# Patient Record
Sex: FEMALE | Race: WHITE | NOT HISPANIC OR LATINO | URBAN - METROPOLITAN AREA
[De-identification: names, ages, dates, MRNs, and addresses within clinical notes are randomized per-mention and may not be internally consistent; named-entity substitution may affect disease eponyms.]

---

## 2017-03-09 ENCOUNTER — ALLSCRIPTS OFFICE VISIT (OUTPATIENT)
Dept: OTHER | Facility: OTHER | Age: 72
End: 2017-03-09

## 2017-03-09 DIAGNOSIS — Z13.820 ENCOUNTER FOR SCREENING FOR OSTEOPOROSIS: ICD-10-CM

## 2017-03-09 DIAGNOSIS — Z12.31 ENCOUNTER FOR SCREENING MAMMOGRAM FOR MALIGNANT NEOPLASM OF BREAST: ICD-10-CM

## 2017-09-28 ENCOUNTER — ALLSCRIPTS OFFICE VISIT (OUTPATIENT)
Dept: OTHER | Facility: OTHER | Age: 72
End: 2017-09-28

## 2017-09-28 DIAGNOSIS — R73.03 PREDIABETES: ICD-10-CM

## 2017-09-28 DIAGNOSIS — E78.5 HYPERLIPIDEMIA: ICD-10-CM

## 2018-01-10 NOTE — RESULT NOTES
Verified Results  (Q) TEST AUTHORIZATION [id number] 02:00AM Jc Chou     Test Name Result Flag Reference   TEST(S) ORDERED ON$REQUISITION HEMOGLOBIN A1c     TEST CODE: [id number]     CLIENT CONTACT: DARSHANA CURRY     REPORT ALWAYS MESSAGE$SIGNATURE See Below     The laboratory testing on this patient was verbally requested  or confirmed by the ordering physician or his or her authorized  representative after contact with an employee of [facility]  Federal regulations require that we maintain on file written  authorization for all laboratory testing  Accordingly we are asking  that the ordering physician or his or her authorized representative  sign a copy of this report and promptly return it to the client    Signature:____________________________________________________     (Q) HEMOGLOBIN A1c [id number] 02:00AM Jc Chou     Test Name Result Flag Reference   HEMOGLOBIN A1c 6 0 % of total Hgb H <5 7   According to ADA guidelines, hemoglobin A1c <7 0%  represents optimal control in non-pregnant diabetic  patients  Different metrics may apply to specific  patient populations  Standards of Medical Care in  [phone]  Diabetes Care  2013;36:s11-s66     For the purpose of screening for the presence of  diabetes  <5 7%       Consistent with the absence of diabetes  5 7-6 4%    Consistent with increased risk for diabetes              (prediabetes)  >or=6 5%    Consistent with diabetes     This assay result is consistent with an increased risk  of diabetes  Currently, no consensus exists for use of hemoglobin  A1c for diagnosis of diabetes for children  NO COLLECTION DATE RECEIVED  WE HAVE USED  THE DATE THE SPECIMEN WAS RECEIVED BY THIS  LABORATORY AS THE COLLECTION DATE  IF THIS  IS INCORRECT, PLEASE CONTACT CLIENT SERVICES    PHONE NUMBER: [phone]     (1) CBC/PLT/DIFF [id number] 02:00AM Jc Chou     Test Name Result Flag Reference   WHITE BLOOD CELL COUNT 9 0 Thousand/uL  3 8-10 8   RED BLOOD CELL COUNT 4 66 Million/uL  3 80-5 10   HEMOGLOBIN 14 3 g/dL  11 7-15 5   HEMATOCRIT 43 2 %  35 0-45 0   MCV 92 6 fL  80 0-100 0   MCH 30 6 pg  27 0-33 0   MCHC 33 0 g/dL  32 0-36 0   RDW 15 3 % H 11 0-15 0   PLATELET COUNT 944 Thousand/uL  140-400   MPV 7 8 fL  7 5-11 5   ABSOLUTE NEUTROPHILS 5022 cells/uL  9403-2206   ABSOLUTE LYMPHOCYTES 3096 cells/uL  850-3900   ABSOLUTE MONOCYTES 693 cells/uL  200-950   ABSOLUTE EOSINOPHILS 144 cells/uL     ABSOLUTE BASOPHILS 45 cells/uL  0-200   NEUTROPHILS 55 8 %     LYMPHOCYTES 34 4 %     MONOCYTES 7 7 %     EOSINOPHILS 1 6 %     BASOPHILS 0 5 %     NO COLLECTION DATE RECEIVED  WE HAVE USED  THE DATE THE SPECIMEN WAS RECEIVED BY THIS  LABORATORY AS THE COLLECTION DATE  IF THIS  IS INCORRECT, PLEASE CONTACT CLIENT SERVICES  PHONE NUMBER: [phone]     (1) CHOLESTEROL, TOTAL [id number] 02:00AM [employer]     Test Name Result Flag Reference   CHOLESTEROL, TOTAL 270 mg/dL H 125-200     (1) COMPREHENSIVE METABOLIC PANEL [id number] 18:96QG [employer] [facility]     Test Name Result Flag Reference   GLUCOSE 135 mg/dL H 65-99   Fasting reference interval   UREA NITROGEN (BUN) 20 mg/dL  7-25   CREATININE 1 07 mg/dL H 0 50-0 99   For patients >52years of age, the reference limit  for Creatinine is approximately 13% higher for people  identified as -American  eGFR NON-AFR   AMERICAN 53 mL/min/1 73m2 L > OR = 60   eGFR AFRICAN AMERICAN 61 mL/min/1 73m2  > OR = 60   BUN/CREATININE RATIO 19 (calc)  6-22   SODIUM 134 mmol/L L 135-146   POTASSIUM 3 6 mmol/L  3 5-5 3   CHLORIDE 98 mmol/L     CARBON DIOXIDE 24 mmol/L  19-30   CALCIUM 10 0 mg/dL  8 6-10 4   PROTEIN, TOTAL 7 6 g/dL  6 1-8 1   ALBUMIN 4 4 g/dL  3 6-5 1   GLOBULIN 3 2 g/dL (calc)  1 9-3 7   ALBUMIN/GLOBULIN RATIO 1 4 (calc)  1 0-2 5   BILIRUBIN, TOTAL 0 4 mg/dL  0 2-1 2   ALKALINE PHOSPHATASE 90 U/L     AST 13 U/L  10-35   ALT 9 U/L  6-29

## 2018-01-13 VITALS
WEIGHT: 126 LBS | BODY MASS INDEX: 22.32 KG/M2 | SYSTOLIC BLOOD PRESSURE: 156 MMHG | DIASTOLIC BLOOD PRESSURE: 68 MMHG | HEIGHT: 63 IN

## 2018-01-14 NOTE — PROGRESS NOTES
Assessment    1  Hypertension, essential (401 9) (I10)   2  Prediabetes (790 29) (R73 03)   3  Hyperlipidemia, unspecified hyperlipidemia type (272 4) (E78 5)    Plan  Hypertension, essential    · AmLODIPine Besylate 10 MG Oral Tablet; TAKE 1 TABLET DAILY  PMH: Encounter for screening colonoscopy    · COLONOSCOPY; Status:Active; Requested for:09Mar2017;   PMH: Encounter for screening mammogram for malignant neoplasm of breast    · * MAMMO SCREENING BILATERAL W CAD; Status:Hold For - Scheduling; Requested  for:09Mar2017;   PMH: Screening for osteoporosis    · * DXA BONE DENSITY SPINE HIP AND PELVIS; Status:Hold For - Scheduling; Requested  for:09Mar2017;     Discussion/Summary    In summary then this is a 72-year-old female smoker follow-up of essential hypertension  Her blood pressure is mildly uncontrolled systolically though she feels it's related to anxiety over having her blood pressure checked  She's asymptomatic from a cardiovascular or pulmonary standpoint  She does exercise  We noted last year that her blood work is in the prediabetic range  She is not fasting today and prefers to wait until her next visit for which she will fast and we'll repeat her CBC CMP and hemoglobin A1c along with her lipids as she does have hyperlipidemia as well  She just wants the fat / cholesterol in her diet, discontinue smoking and continue her exercise pattern she agrees  She is not interested in pharmacologic treatment of her hyperlipidemia presently  She declines influenza vaccine  History of Present Illness  The patient states her hyperlipidemia has been stable since the last visit  Comorbid Illnesses: hypertension  Symptoms: denies chest pain and denies muscle pain  Associated symptoms include no focal neurologic deficits  Lifestyle: Diet: She consumes a diverse and healthy diet  Weight Issues: She does not have any weight concerns  Exercise: She exercises regularly  Smoking: She uses tobacco Alcohol: She denies alcohol use  Drug Use: She denies drug use  Medications: The patient is not currently on any medications for her hyperlipidemia  The patient is not doing well with her hyperlipidemia goals  The patient is due for a lipid panel  The patient is a 74-year-old female here for follow-up of essential hypertension  She was also noted to have hyperlipidemia and prediabetes her fasting blood work one year ago  She's been asked to return fasting but did not do so again today  She denies any cardiovascular or pulmonary complaint  She remains physically active and does planks other exercises that her BMI is in the ideal body weight range  She does continue to use tobacco which we discussed again with her  Alcohol use is infrequent  She does eat a healthy diet though she does not specifically attention to carbohydrates her cholesterol  The patient presents for follow-up of essential hypertension  The patient states she has been stable with her blood pressure control since the last visit  She has no comorbid illnesses  Symptoms: denies dyspnea, denies chest pain and denies lower extremity edema  Associated symptoms include no headache and no focal neurologic deficits  Lifestyle: Diet: She consumes a diverse and healthy diet  Weight Issues: She does not have any weight concerns  Exercise: She exercises regularly  Smoking: She uses tobacco Alcohol: She denies alcohol use  Drug Use: She denies drug use  Does planks  1 PPD  Medications: the patient is adherent with her medication regimen  She denies medication side effects  Medication(s): a calcium channel blocker  The patient is being seen for a routine clinic follow-up of pre-diabetes  Recent measurements: hemoglobin A1c date 2/2016 and hemoglobin A1c 6 0 %  Her weight is unchanged  The patient is currently asymptomatic  Review of Systems    Constitutional: recent 1 5 lb weight gain     Cardiovascular: No complaints of slow heart rate, no fast heart rate, no chest pain, no palpitations, no leg claudication, no lower extremity edema  Respiratory: No complaints of shortness of breath, no wheezing, no cough, no SOB on exertion, no orthopnea, no PND  Gastrointestinal: no constipation, no diarrhea and no blood in stools  Genitourinary: no dysuria, no incontinence and no unexplained vaginal bleeding  Neurological: no headache and no dizziness  Psychiatric: no anxiety and no depression  Active Problems    1  Hypertension, essential (401 9) (I10)    Social History    · Current every day smoker (305 1) (F17 200)  The social history was reviewed and updated today  The social history was reviewed and is unchanged  Current Meds   1  AmLODIPine Besylate 10 MG Oral Tablet; TAKE 1 TABLET DAILY; Therapy: [id number] to (Evaluate:00Gyi7900)  Requested for: 40YWG3972; Last   Rx:13Jan2017 Ordered    Allergies    1  No Known Drug Allergies    Vitals  Vital Signs    Recorded: [id number] 08:54AM Recorded: 90DTU4285 08:33AM   Temperature  24 5 F   Systolic 283, LUE, Sitting 160, LUE, Sitting   Diastolic 84, LUE, Sitting 80, LUE, Sitting   BP CUFF SIZE Large    Height  5 ft 2 5 in   Weight  126 lb    BMI Calculated  22 68   BSA Calculated  1 58     Physical Exam    Constitutional   General appearance: No acute distress, well appearing and well nourished  Pulmonary   Respiratory effort: No increased work of breathing or signs of respiratory distress  Auscultation of lungs: Clear to auscultation  Cardiovascular   Auscultation of heart: Normal rate and rhythm, normal S1 and S2, without murmurs  The heart rate was normal  The rhythm was regular  Heart sounds: normal S1, normal S2 and no gallop heard  no murmurs were heard  Examination of extremities for edema and/or varicosities: Normal     Carotid pulses: Normal   no bruit heard over the right carotid and no bruit heard over the left carotid  Lymphatic   Palpation of lymph nodes in neck: No lymphadenopathy  Psychiatric   Orientation to person, place, and time: Normal     Mood and affect: Normal          Results/Data  PHQ-2 Adult Depression Screening 24KXH0173 08:34AM User, [employer]     Test Name Result Flag Reference   PHQ-2 Adult Depression Score 0     Over the last two weeks, how often have you been bothered by any of the following problems?   Little interest or pleasure in doing things: Not at all - 0  Feeling down, depressed, or hopeless: Not at all - 0   PHQ-2 Adult Depression Screening Negative       Falls Risk Assessment (Dx Z13 89 Screen for Neurologic Disorder) 40MMI3329 08:34AM User, [facility]     Test Name Result Flag Reference   Falls Risk      No falls in the past year       Signatures   Electronically signed by : ANICETO Leija ; Mar  9 2017  9:11AM EST                       (Author)

## 2018-01-22 VITALS
HEIGHT: 63 IN | DIASTOLIC BLOOD PRESSURE: 84 MMHG | WEIGHT: 126 LBS | BODY MASS INDEX: 22.32 KG/M2 | TEMPERATURE: 98.1 F | SYSTOLIC BLOOD PRESSURE: 146 MMHG

## 2018-04-10 PROBLEM — R73.03 PREDIABETES: Status: ACTIVE | Noted: 2017-03-09

## 2018-04-10 PROBLEM — E78.5 HYPERLIPIDEMIA: Status: ACTIVE | Noted: 2017-03-09

## 2018-06-18 ENCOUNTER — OFFICE VISIT (OUTPATIENT)
Dept: FAMILY MEDICINE CLINIC | Facility: CLINIC | Age: 73
End: 2018-06-18
Payer: MEDICARE

## 2018-06-18 VITALS
TEMPERATURE: 98.6 F | HEIGHT: 63 IN | DIASTOLIC BLOOD PRESSURE: 60 MMHG | BODY MASS INDEX: 23.92 KG/M2 | HEART RATE: 76 BPM | WEIGHT: 135 LBS | OXYGEN SATURATION: 98 % | SYSTOLIC BLOOD PRESSURE: 140 MMHG

## 2018-06-18 DIAGNOSIS — I10 HYPERTENSION, ESSENTIAL: Primary | ICD-10-CM

## 2018-06-18 PROCEDURE — 99213 OFFICE O/P EST LOW 20 MIN: CPT | Performed by: FAMILY MEDICINE

## 2018-06-18 RX ORDER — AMLODIPINE BESYLATE 10 MG/1
10 TABLET ORAL DAILY
Qty: 90 TABLET | Refills: 1 | Status: SHIPPED | OUTPATIENT
Start: 2018-06-18 | End: 2018-06-18 | Stop reason: SDUPTHER

## 2018-06-18 RX ORDER — AMLODIPINE BESYLATE 10 MG/1
10 TABLET ORAL DAILY
Qty: 30 TABLET | Refills: 0 | Status: SHIPPED | OUTPATIENT
Start: 2018-06-18 | End: 2019-01-10 | Stop reason: SDUPTHER

## 2018-06-18 NOTE — PATIENT INSTRUCTIONS
Please continue with prudent diet and try to increase your exercise regimen  Continue with her amlodipine  Please consider having your blood work performed  We will see you back in 6 months or sooner as needed

## 2018-06-18 NOTE — ASSESSMENT & PLAN NOTE
The patient is going to continue with amlodipine 10 mg daily  Based on home blood pressures and diastolic blood pressure today believe it is well controlled  We again and discussion of the fact that she has had no blood work for 2 years now  We have no idea as to her lipids, sugar renal function all of which were less than optimal at last check  She again declines having blood work performed though she stated as Shree Gonzales will consider it    I consider discharging her from the practice at her last visit but subsequent statements indicated that she would just not seek medical attention if so  I believe it is better to try to maintain control of her hypertension which she agrees to as an alternative to no treatment at all  She agrees to return in 6 months  She will call sooner as needed

## 2018-06-18 NOTE — PROGRESS NOTES
Assessment/Plan:  Hypertension, essential  The patient is going to continue with amlodipine 10 mg daily  Based on home blood pressures and diastolic blood pressure today believe it is well controlled  We again and discussion of the fact that she has had no blood work for 2 years now  We have no idea as to her lipids, sugar renal function all of which were less than optimal at last check  She again declines having blood work performed though she stated as Shree Gonzales will consider it    I consider discharging her from the practice at her last visit but subsequent statements indicated that she would just not seek medical attention if so  I believe it is better to try to maintain control of her hypertension which she agrees to as an alternative to no treatment at all  She agrees to return in 6 months  She will call sooner as needed  Diagnoses and all orders for this visit:    Hypertension, essential  -     Discontinue: amLODIPine (NORVASC) 10 mg tablet; Take 1 tablet (10 mg total) by mouth daily  -     amLODIPine (NORVASC) 10 mg tablet; Take 1 tablet (10 mg total) by mouth daily          Subjective:   Chief Complaint   Patient presents with    Blood Pressure Check     pt here for bp check  she needs a refill on her bp med which i did renew  she needs all her routine fasting labs done  Patient ID: Teagan Smith is a 72 y o  female  Home Bps are 118/60  I get dizzy at times when my BP gets down below 110  HPI  The patient is a 42-year-old female with a history of hypertension for which she is compliant with amlodipine  She states that her home blood pressures typically run between 110 and 120/60  She never gets blood pressures above 140  She has no headaches double vision diplopia chest pain shortness of breath or other cardiovascular pulmonary complaint  She has no GI or  complaint  The polyuria polydipsia polyphagia    She does note that she gained significant weight over the winter due to less activity and increased appetite  The following portions of the patient's history were reviewed and updated as appropriate: allergies, current medications, past family history, past medical history, past social history, past surgical history and problem list     Review of Systems   Constitution: Positive for weight gain  Negative for decreased appetite  9 pounds   Cardiovascular: Negative for chest pain, irregular heartbeat and leg swelling  Respiratory: Negative for cough, hemoptysis, shortness of breath and wheezing  Gastrointestinal: Negative for constipation, diarrhea, hematochezia, nausea and vomiting  Genitourinary: Negative for bladder incontinence, hesitancy and urgency  Objective:    Physical Exam   Constitutional: She is oriented to person, place, and time  She appears well-developed and well-nourished  No distress  Neck: Neck supple  No JVD present  No thyromegaly present  Cardiovascular: Normal rate, regular rhythm, normal heart sounds and intact distal pulses  Exam reveals no gallop  No murmur heard  Pulmonary/Chest: Effort normal and breath sounds normal  No respiratory distress  She has no wheezes  She has no rales  Musculoskeletal: She exhibits no edema  Lymphadenopathy:     She has no cervical adenopathy  Neurological: She is alert and oriented to person, place, and time  Psychiatric: She has a normal mood and affect

## 2019-01-10 ENCOUNTER — OFFICE VISIT (OUTPATIENT)
Dept: FAMILY MEDICINE CLINIC | Facility: CLINIC | Age: 74
End: 2019-01-10
Payer: MEDICARE

## 2019-01-10 VITALS
SYSTOLIC BLOOD PRESSURE: 180 MMHG | WEIGHT: 145 LBS | HEART RATE: 74 BPM | BODY MASS INDEX: 26.1 KG/M2 | DIASTOLIC BLOOD PRESSURE: 94 MMHG | TEMPERATURE: 96 F | OXYGEN SATURATION: 96 %

## 2019-01-10 DIAGNOSIS — I10 HYPERTENSION, ESSENTIAL: Primary | ICD-10-CM

## 2019-01-10 DIAGNOSIS — R73.03 PREDIABETES: ICD-10-CM

## 2019-01-10 DIAGNOSIS — Z23 ENCOUNTER FOR IMMUNIZATION: ICD-10-CM

## 2019-01-10 DIAGNOSIS — E78.5 HYPERLIPIDEMIA, UNSPECIFIED HYPERLIPIDEMIA TYPE: ICD-10-CM

## 2019-01-10 PROCEDURE — 99214 OFFICE O/P EST MOD 30 MIN: CPT | Performed by: FAMILY MEDICINE

## 2019-01-10 RX ORDER — AMLODIPINE BESYLATE 10 MG/1
10 TABLET ORAL DAILY
Qty: 90 TABLET | Refills: 1 | Status: SHIPPED | OUTPATIENT
Start: 2019-01-10 | End: 2019-07-11 | Stop reason: SDUPTHER

## 2019-01-10 NOTE — ASSESSMENT & PLAN NOTE
Her blood pressure is uncontrolled in the office today  She does have a history of white coat hypertension   Home blood pressures are well controlled per assertation  We asked her to resume her exercise pattern when she can, continue to restrict the salt in her diet  We also had a long discussion in regards to her lack of health maintenance needs again her unwillingness to go for blood work to assess her history of prediabetes, hyperlipidemia and possible early kidney disease  She seems somewhat more willing to address this based on her discussion today  She states that she Alicia Clements' have fasting blood work performed at her next visit  We encouraged her to come fasting and do so  We refilled her medication today

## 2019-01-10 NOTE — ASSESSMENT & PLAN NOTE
Begin monitoring diet for concentrated sweets  Resume exercise program when able  Seriously consider having fasting blood work performed at next visit

## 2019-01-10 NOTE — PROGRESS NOTES
Assessment/Plan:  Hypertension, essential  Her blood pressure is uncontrolled in the office today  She does have a history of white coat hypertension   Home blood pressures are well controlled per assertation  We asked her to resume her exercise pattern when she can, continue to restrict the salt in her diet  We also had a long discussion in regards to her lack of health maintenance needs again her unwillingness to go for blood work to assess her history of prediabetes, hyperlipidemia and possible early kidney disease  She seems somewhat more willing to address this based on her discussion today  She states that she Dora Aviles' have fasting blood work performed at her next visit  We encouraged her to come fasting and do so  We refilled her medication today  Hyperlipidemia  Begin monitoring diet for concentrated sweets  Resume exercise program when able  Seriously consider having fasting blood work performed at next visit  Prediabetes  Low concentrated sweet diet, exercise and weight loss  Diagnoses and all orders for this visit:    Hypertension, essential  -     amLODIPine (NORVASC) 10 mg tablet; Take 1 tablet (10 mg total) by mouth daily    Encounter for immunization  -     PREFERRED: influenza vaccine, [id number], high-dose, PF 0 5 mL, for patients 65 yr+ (FLUZONE HIGH-DOSE)    Hyperlipidemia, unspecified hyperlipidemia type    Prediabetes          Subjective:   Chief Complaint   Patient presents with    Blood Pressure Check     here for her 6 mth med check and refills  pt is not fasting today  pt declined the flu shot,     C/w Norvasc  I havent been able to exercise due to work on my house  Home Bps 136/70s  I feel good  No CP, SOB, edema, Has, diplopia  Patient ID: Sandra Leonardo is a 69 y o  female  HPI  The patient is a 74-year-old female who presents today for follow-up of essential hypertension    She also has hyperlipidemia and history of prediabetes which she has not addressed for some time  She has been compliant with her amlodipine  She states that typically her blood pressures are in the 110/70 range though recently due to inability to exercise based on renew patient's to home her blood pressures been run in the 120s over 80s range  She has had no cardiovascular pulmonary complaint  No headache diplopia or focal neurologic symptomatology  The following portions of the patient's history were reviewed and updated as appropriate: allergies, current medications, past family history, past medical history, past social history, past surgical history and problem list     Review of Systems   Constitution: Positive for weight gain  Negative for decreased appetite  10 pounds over holidays   Cardiovascular: Negative for chest pain, irregular heartbeat, leg swelling, orthopnea and paroxysmal nocturnal dyspnea  Respiratory: Positive for cough and shortness of breath  Negative for wheezing  Due to a viral illness and " just about gone "   Endocrine: Negative for polydipsia, polyphagia and polyuria  Hematologic/Lymphatic: Negative for adenopathy and bleeding problem  Gastrointestinal: Negative for constipation, diarrhea and hematochezia  Genitourinary: Positive for nocturia  Negative for frequency and hesitancy  1-2 x nocturia  Neurological: Negative for headaches  Objective:    Physical Exam   Constitutional: She is oriented to person, place, and time  She appears well-developed and well-nourished  Cardiovascular: Normal rate, regular rhythm and normal heart sounds  Occasional premature beats  Pulmonary/Chest: Effort normal and breath sounds normal  No respiratory distress  She has no wheezes  She has no rales  Musculoskeletal: She exhibits no edema  Neurological: She is alert and oriented to person, place, and time  Psychiatric: She has a normal mood and affect  Thought content normal    Nursing note and vitals reviewed

## 2019-07-11 ENCOUNTER — OFFICE VISIT (OUTPATIENT)
Dept: FAMILY MEDICINE CLINIC | Facility: CLINIC | Age: 74
End: 2019-07-11
Payer: MEDICARE

## 2019-07-11 VITALS
SYSTOLIC BLOOD PRESSURE: 138 MMHG | TEMPERATURE: 96.2 F | HEART RATE: 67 BPM | BODY MASS INDEX: 25.58 KG/M2 | DIASTOLIC BLOOD PRESSURE: 86 MMHG | OXYGEN SATURATION: 97 % | HEIGHT: 62 IN | WEIGHT: 139 LBS

## 2019-07-11 DIAGNOSIS — I10 HYPERTENSION, ESSENTIAL: Primary | ICD-10-CM

## 2019-07-11 DIAGNOSIS — R73.03 PREDIABETES: ICD-10-CM

## 2019-07-11 DIAGNOSIS — E78.5 HYPERLIPIDEMIA, UNSPECIFIED HYPERLIPIDEMIA TYPE: ICD-10-CM

## 2019-07-11 PROCEDURE — 99213 OFFICE O/P EST LOW 20 MIN: CPT | Performed by: FAMILY MEDICINE

## 2019-07-11 RX ORDER — AMLODIPINE BESYLATE 10 MG/1
10 TABLET ORAL DAILY
Qty: 90 TABLET | Refills: 1 | Status: SHIPPED | OUTPATIENT
Start: 2019-07-11 | End: 2020-01-23 | Stop reason: SDUPTHER

## 2019-07-11 NOTE — ASSESSMENT & PLAN NOTE
Blood pressure is well controlled based on home readings  Office reading is better today  She does have some degree of white coat hypertension  Again we discussed that she is overdue for blood work in regards to lipids, blood sugar, renal function X cetera  She states that she will consider blood work at her next visit though not presently

## 2019-07-11 NOTE — PROGRESS NOTES
Assessment/Plan:  Hypertension, essential  Blood pressure is well controlled based on home readings  Office reading is better today  She does have some degree of white coat hypertension  Again we discussed that she is overdue for blood work in regards to lipids, blood sugar, renal function X cetera  She states that she will consider blood work at her next visit though not presently  Diagnoses and all orders for this visit:    Hypertension, essential  -     amLODIPine (NORVASC) 10 mg tablet; Take 1 tablet (10 mg total) by mouth daily    Prediabetes    Hyperlipidemia, unspecified hyperlipidemia type          Subjective:   Chief Complaint   Patient presents with    Blood Pressure Check     here for 6 mth med checkup  bmi f/u plan needed     My grandson is getting  in 2 weeks  I feel great  Home Bps are 90s-120s at home  Can feel orthostatic at times  I lost 6 pounds for the wedding   Patient ID: Mati Krause is a 69 y o  female  HPI  The patient is a 65-year-old female with history of essential hypertension, pre diabetes and hyperlipidemia  She states that she is feeling well  She has been trying to lose weight for her grandson's wedding coming up in 2 weeks  She states she has had no cardiovascular pulmonary complaint other than fleeting orthostasis when she stands from bending  She states that she notes that her blood pressures are frequently in the 90s range when this happens  Typically the never over 120  She has no headache diplopia or focal neurologic symptom  She is not fasting again today  The following portions of the patient's history were reviewed and updated as appropriate: allergies, current medications, past medical history, past social history, past surgical history and problem list     Review of Systems   Constitution: Negative for decreased appetite, weight gain and weight loss     Cardiovascular: Negative for chest pain, irregular heartbeat, leg swelling, palpitations and syncope  Respiratory: Negative for cough, shortness of breath and wheezing  Endocrine: Negative for polydipsia and polyphagia  Gastrointestinal: Negative for bowel incontinence  Genitourinary: Negative for bladder incontinence  Neurological: Positive for light-headedness  Negative for dizziness and headaches  Objective:    Physical Exam   Constitutional: She is oriented to person, place, and time  She appears well-developed and well-nourished  Eyes: Right eye exhibits no discharge  Left eye exhibits no discharge  No scleral icterus  Neck: No JVD present  Cardiovascular: Normal rate, regular rhythm and normal heart sounds  No carotid bruit   Pulmonary/Chest: Effort normal and breath sounds normal    Musculoskeletal: She exhibits no edema  Lymphadenopathy:     She has no cervical adenopathy  Neurological: She is alert and oriented to person, place, and time  Skin: No erythema  Psychiatric: She has a normal mood and affect  Thought content normal    Nursing note and vitals reviewed

## 2020-01-23 ENCOUNTER — OFFICE VISIT (OUTPATIENT)
Dept: FAMILY MEDICINE CLINIC | Facility: CLINIC | Age: 75
End: 2020-01-23
Payer: MEDICARE

## 2020-01-23 VITALS
BODY MASS INDEX: 27.97 KG/M2 | SYSTOLIC BLOOD PRESSURE: 138 MMHG | HEIGHT: 62 IN | DIASTOLIC BLOOD PRESSURE: 82 MMHG | OXYGEN SATURATION: 97 % | WEIGHT: 152 LBS

## 2020-01-23 DIAGNOSIS — I10 HYPERTENSION, ESSENTIAL: Primary | ICD-10-CM

## 2020-01-23 DIAGNOSIS — R73.03 PREDIABETES: ICD-10-CM

## 2020-01-23 DIAGNOSIS — E78.5 HYPERLIPIDEMIA, UNSPECIFIED HYPERLIPIDEMIA TYPE: ICD-10-CM

## 2020-01-23 DIAGNOSIS — B97.89 VIRAL RESPIRATORY ILLNESS: ICD-10-CM

## 2020-01-23 DIAGNOSIS — J98.8 VIRAL RESPIRATORY ILLNESS: ICD-10-CM

## 2020-01-23 PROCEDURE — 99214 OFFICE O/P EST MOD 30 MIN: CPT | Performed by: FAMILY MEDICINE

## 2020-01-23 RX ORDER — AMLODIPINE BESYLATE 10 MG/1
10 TABLET ORAL DAILY
Qty: 90 TABLET | Refills: 1 | Status: SHIPPED | OUTPATIENT
Start: 2020-01-23 | End: 2020-08-06 | Stop reason: SDUPTHER

## 2020-01-23 NOTE — ASSESSMENT & PLAN NOTE
Continue with low concentrated sweet diet  Needs to have fasting blood work performed to assess her documented prediabetes  She has no polyuria polydipsia polyphagia  Again discussed the implications of diabetes and need to get blood work to assess her known prediabetic condition with no recent follow-up  She agrees to blood work at her next visit  She does have a respiratory syndrome today

## 2020-01-23 NOTE — ASSESSMENT & PLAN NOTE
The patient has a cough an apparent viral respiratory illness  She has no fever and normal respiratory rate and pulse ox today  No evidence of pneumonitis on examination today  Family members have been ill with a coughing illness for the past few weeks as well  She is going to push fluids and rest   If her symptoms have not improved over the next several days or any time she feels significant deterioration she is going to call or seek more urgent medical attention  We again no to her that she needs to discontinue tobacco use  She agrees with the plan regarding her illness

## 2020-01-23 NOTE — ASSESSMENT & PLAN NOTE
Continue with efforts at low-fat low-cholesterol diet as well as improved exercise regimen  Lipid profile at next visit

## 2020-01-23 NOTE — PROGRESS NOTES
BMI Counseling: Body mass index is 27 8 kg/m²  The BMI is above normal  Nutrition recommendations include decreasing portion sizes, encouraging healthy choices of fruits and vegetables and moderation in carbohydrate intake  Exercise recommendations include moderate physical activity 150 minutes/week and exercising 3-5 times per week  No pharmacotherapy was ordered  Tobacco Cessation Counseling: Tobacco cessation counseling was provided  The patient is sincerely urged to quit consumption of tobacco  She is not ready to quit tobacco  Medication options not discussed  Patient refused medication  Assessment/Plan:  Hypertension, essential  Her blood pressure is acceptably controlled today  She will continue on amlodipine 10 mg daily  She is again not fasting today  She has had no blood work for nearly 4 years now  Her last blood work revealed pre diabetes, borderline renal dysfunction and hyperlipidemia  We again discussed with her the importance of getting some fasting blood work to assess her lipids, metabolic profile as well as blood count and A1c  She states I promise I will fast for my next visit  She is not interested in getting it performed at a lab in the meantime  Hyperlipidemia  Continue with efforts at low-fat low-cholesterol diet as well as improved exercise regimen  Lipid profile at next visit  Prediabetes  Continue with low concentrated sweet diet  Needs to have fasting blood work performed to assess her documented prediabetes  She has no polyuria polydipsia polyphagia  Again discussed the implications of diabetes and need to get blood work to assess her known prediabetic condition with no recent follow-up  She agrees to blood work at her next visit  She does have a respiratory syndrome today  Viral respiratory illness  The patient has a cough an apparent viral respiratory illness  She has no fever and normal respiratory rate and pulse ox today    No evidence of pneumonitis on examination today  Family members have been ill with a coughing illness for the past few weeks as well  She is going to push fluids and rest   If her symptoms have not improved over the next several days or any time she feels significant deterioration she is going to call or seek more urgent medical attention  We again no to her that she needs to discontinue tobacco use  She agrees with the plan regarding her illness  Diagnoses and all orders for this visit:    Hypertension, essential  -     amLODIPine (NORVASC) 10 mg tablet; Take 1 tablet (10 mg total) by mouth daily    Hyperlipidemia, unspecified hyperlipidemia type    Prediabetes    Viral respiratory illness          Subjective:   Chief Complaint   Patient presents with    Follow-up     6 mo med check        Patient ID: Hector Barksdale is a 70 y o  female  Cough for a week with mild SOB  No wheeze, fever,CP  No edema  Advil for knee pain  No swelling  HPI  The patient is a 70-year-old female presents today for follow-up of multiple medical problems including essential hypertension, hyperlipidemia, pre diabetes in addition to a cough which she has had for week now  She has no chest pain wheezing hemoptysis or significant sputum but she does feel mildly short of breath especially at night  She has had no PND orthopnea or edema  No headache diplopia or focal neurologic symptom  The following portions of the patient's history were reviewed and updated as appropriate: allergies, current medications, past medical history, past social history, past surgical history and problem list     Review of Systems   Constitution: Negative for chills, decreased appetite, fever and malaise/fatigue  Cardiovascular: Negative for chest pain, irregular heartbeat, leg swelling, orthopnea and paroxysmal nocturnal dyspnea  Respiratory: Positive for cough and shortness of breath  Negative for hemoptysis, sputum production and wheezing      Endocrine: Negative for polydipsia, polyphagia and polyuria  Hematologic/Lymphatic: Negative for adenopathy and bleeding problem  Does not bruise/bleed easily  Musculoskeletal: Negative for myalgias  Gastrointestinal: Negative for bowel incontinence, constipation and diarrhea  Genitourinary: Negative for dysuria, frequency, nocturia and urgency  Neurological: Negative for dizziness and headaches  Psychiatric/Behavioral: Negative for depression  The patient does not have insomnia and is not nervous/anxious  Objective:    Physical Exam   Constitutional: She is oriented to person, place, and time  She appears well-developed and well-nourished  Frequent nonproductive cough but in no distress   Eyes: Conjunctivae are normal  Right eye exhibits no discharge  Left eye exhibits no discharge  Neck: No JVD present  No thyromegaly present  Cardiovascular: Normal rate, regular rhythm and normal heart sounds  Normal carotid upstroke   Pulmonary/Chest: Effort normal and breath sounds normal  No respiratory distress  She has no wheezes  She has no rales  Musculoskeletal: She exhibits no edema  Lymphadenopathy:     She has no cervical adenopathy  Neurological: She is alert and oriented to person, place, and time  Psychiatric: She has a normal mood and affect  Thought content normal    Nursing note and vitals reviewed

## 2020-01-23 NOTE — ASSESSMENT & PLAN NOTE
Her blood pressure is acceptably controlled today  She will continue on amlodipine 10 mg daily  She is again not fasting today  She has had no blood work for nearly 4 years now  Her last blood work revealed pre diabetes, borderline renal dysfunction and hyperlipidemia  We again discussed with her the importance of getting some fasting blood work to assess her lipids, metabolic profile as well as blood count and A1c  She states I promise I will fast for my next visit  She is not interested in getting it performed at a lab in the meantime

## 2020-08-06 ENCOUNTER — OFFICE VISIT (OUTPATIENT)
Dept: FAMILY MEDICINE CLINIC | Facility: CLINIC | Age: 75
End: 2020-08-06
Payer: MEDICARE

## 2020-08-06 VITALS
BODY MASS INDEX: 24.66 KG/M2 | SYSTOLIC BLOOD PRESSURE: 162 MMHG | HEIGHT: 62 IN | TEMPERATURE: 97.8 F | WEIGHT: 134 LBS | DIASTOLIC BLOOD PRESSURE: 84 MMHG

## 2020-08-06 DIAGNOSIS — B97.89 VIRAL RESPIRATORY ILLNESS: ICD-10-CM

## 2020-08-06 DIAGNOSIS — R73.03 PREDIABETES: ICD-10-CM

## 2020-08-06 DIAGNOSIS — I10 HYPERTENSION, ESSENTIAL: Primary | ICD-10-CM

## 2020-08-06 DIAGNOSIS — E78.5 HYPERLIPIDEMIA, UNSPECIFIED HYPERLIPIDEMIA TYPE: ICD-10-CM

## 2020-08-06 DIAGNOSIS — J98.8 VIRAL RESPIRATORY ILLNESS: ICD-10-CM

## 2020-08-06 DIAGNOSIS — R53.83 FATIGUE, UNSPECIFIED TYPE: ICD-10-CM

## 2020-08-06 PROCEDURE — 3077F SYST BP >= 140 MM HG: CPT | Performed by: FAMILY MEDICINE

## 2020-08-06 PROCEDURE — 3079F DIAST BP 80-89 MM HG: CPT | Performed by: FAMILY MEDICINE

## 2020-08-06 PROCEDURE — 1160F RVW MEDS BY RX/DR IN RCRD: CPT | Performed by: FAMILY MEDICINE

## 2020-08-06 PROCEDURE — 4004F PT TOBACCO SCREEN RCVD TLK: CPT | Performed by: FAMILY MEDICINE

## 2020-08-06 PROCEDURE — 99214 OFFICE O/P EST MOD 30 MIN: CPT | Performed by: FAMILY MEDICINE

## 2020-08-06 PROCEDURE — 3008F BODY MASS INDEX DOCD: CPT | Performed by: FAMILY MEDICINE

## 2020-08-06 PROCEDURE — 36415 COLL VENOUS BLD VENIPUNCTURE: CPT | Performed by: FAMILY MEDICINE

## 2020-08-06 RX ORDER — AMLODIPINE BESYLATE 10 MG/1
10 TABLET ORAL DAILY
Qty: 90 TABLET | Refills: 1 | Status: SHIPPED | OUTPATIENT
Start: 2020-08-06 | End: 2021-02-19 | Stop reason: SDUPTHER

## 2020-08-06 NOTE — PROGRESS NOTES
Assessment/Plan:  Viral respiratory illness  Her cough from January resolved  We again advised her to discontinue tobacco use    Hypertension, essential  She continues to have elevated systolic blood pressure in the office  We believe this is some component of white coat hypertension  She continues to maintain her weight and watch her diet  She continues with salt restriction  We are going to get some fasting blood work today to include CBC, CMP and TSH as well as lipids    Hyperlipidemia  Lipid profile today  Prediabetes  Hemoglobin A1c today  Diagnoses and all orders for this visit:    Hypertension, essential  -     CBC  -     Comprehensive metabolic panel  -     amLODIPine (NORVASC) 10 mg tablet; Take 1 tablet (10 mg total) by mouth daily    Hyperlipidemia, unspecified hyperlipidemia type  -     Lipid panel    Viral respiratory illness    Prediabetes          Subjective:   Chief Complaint   Patient presents with    Follow-up     6 mo med check/FBW        Patient ID: Abundio Gray is a 70 y o  female  HPI     Patient is a 72-year-old female with a history of essential hypertension, hyperlipidemia and prediabetes who presents today for follow-up of same  She states that overall she has been feeling well  She denies any headache or diplopia  No chest pain shortness of breath or any focal neurologic symptoms  She is compliant with her amlodipine without issue  She does complain of some stiffness in her hands and knees and feels that it is degenerative arthritis  The following portions of the patient's history were reviewed and updated as appropriate: allergies, current medications, past family history, past medical history, past social history, past surgical history and problem list     Review of Systems   Constitution: Negative for decreased appetite, fever, malaise/fatigue and night sweats     Cardiovascular: Negative for chest pain, irregular heartbeat, leg swelling, orthopnea and paroxysmal nocturnal dyspnea  Respiratory: Negative for cough, shortness of breath and wheezing  Endocrine: Negative for polydipsia, polyphagia and polyuria  Hematologic/Lymphatic: Negative for adenopathy and bleeding problem  Does not bruise/bleed easily  Musculoskeletal: Positive for arthritis and stiffness  Negative for muscle weakness  Gastrointestinal: Negative for constipation and diarrhea  Neurological: Negative for focal weakness and headaches  Psychiatric/Behavioral: Negative for depression  The patient does not have insomnia and is not nervous/anxious  Objective:    Physical Exam   Constitutional: She is oriented to person, place, and time  She appears well-developed and well-nourished  No distress  Eyes: Right eye exhibits no discharge  Left eye exhibits no discharge  No scleral icterus  Neck: No JVD present  No thyromegaly present  Cardiovascular: Normal rate, regular rhythm and normal heart sounds  Pulmonary/Chest: Effort normal and breath sounds normal    Musculoskeletal:         General: No edema  Lymphadenopathy:     She has no cervical adenopathy  Neurological: She is alert and oriented to person, place, and time  Psychiatric: She has a normal mood and affect  Thought content normal    Nursing note and vitals reviewed

## 2020-08-06 NOTE — ASSESSMENT & PLAN NOTE
She continues to have elevated systolic blood pressure in the office  We believe this is some component of white coat hypertension  She continues to maintain her weight and watch her diet  She continues with salt restriction    We are going to get some fasting blood work today to include CBC, CMP and TSH as well as lipids

## 2020-08-08 LAB
ALBUMIN SERPL-MCNC: 4.7 G/DL (ref 3.6–5.1)
ALBUMIN/GLOB SERPL: 1.4 (CALC) (ref 1–2.5)
ALP SERPL-CCNC: 148 U/L (ref 37–153)
ALT SERPL-CCNC: 13 U/L (ref 6–29)
AST SERPL-CCNC: 15 U/L (ref 10–35)
BILIRUB SERPL-MCNC: 0.6 MG/DL (ref 0.2–1.2)
BUN SERPL-MCNC: 11 MG/DL (ref 7–25)
BUN/CREAT SERPL: ABNORMAL (CALC) (ref 6–22)
CALCIUM SERPL-MCNC: 10.2 MG/DL (ref 8.6–10.4)
CHLORIDE SERPL-SCNC: 102 MMOL/L (ref 98–110)
CHOLEST SERPL-MCNC: 282 MG/DL
CHOLEST/HDLC SERPL: 6.4 (CALC)
CO2 SERPL-SCNC: 23 MMOL/L (ref 20–32)
CREAT SERPL-MCNC: 0.84 MG/DL (ref 0.6–0.93)
ERYTHROCYTE [DISTWIDTH] IN BLOOD BY AUTOMATED COUNT: 13.4 % (ref 11–15)
EST. AVERAGE GLUCOSE BLD GHB EST-MCNC: 120 (CALC)
EST. AVERAGE GLUCOSE BLD GHB EST-SCNC: 6.6 (CALC)
GLOBULIN SER CALC-MCNC: 3.4 G/DL (CALC) (ref 1.9–3.7)
GLUCOSE SERPL-MCNC: 108 MG/DL (ref 65–99)
HBA1C MFR BLD: 5.8 % OF TOTAL HGB
HCT VFR BLD AUTO: 46.3 % (ref 35–45)
HDLC SERPL-MCNC: 44 MG/DL
HGB BLD-MCNC: 15.2 G/DL (ref 11.7–15.5)
LDLC SERPL CALC-MCNC: 195 MG/DL (CALC)
MCH RBC QN AUTO: 31.5 PG (ref 27–33)
MCHC RBC AUTO-ENTMCNC: 32.8 G/DL (ref 32–36)
MCV RBC AUTO: 96.1 FL (ref 80–100)
NONHDLC SERPL-MCNC: 238 MG/DL (CALC)
PLATELET # BLD AUTO: 282 THOUSAND/UL (ref 140–400)
PMV BLD REES-ECKER: 9.7 FL (ref 7.5–12.5)
POTASSIUM SERPL-SCNC: 4.1 MMOL/L (ref 3.5–5.3)
PROT SERPL-MCNC: 8.1 G/DL (ref 6.1–8.1)
RBC # BLD AUTO: 4.82 MILLION/UL (ref 3.8–5.1)
SL AMB EGFR AFRICAN AMERICAN: 80 ML/MIN/1.73M2
SL AMB EGFR NON AFRICAN AMERICAN: 69 ML/MIN/1.73M2
SODIUM SERPL-SCNC: 137 MMOL/L (ref 135–146)
TRIGL SERPL-MCNC: 233 MG/DL
TSH SERPL-ACNC: 4.99 MIU/L (ref 0.4–4.5)
WBC # BLD AUTO: 8.7 THOUSAND/UL (ref 3.8–10.8)

## 2021-02-19 ENCOUNTER — TELEMEDICINE (OUTPATIENT)
Dept: FAMILY MEDICINE CLINIC | Facility: CLINIC | Age: 76
End: 2021-02-19
Payer: MEDICARE

## 2021-02-19 VITALS
HEIGHT: 62 IN | WEIGHT: 136 LBS | DIASTOLIC BLOOD PRESSURE: 65 MMHG | BODY MASS INDEX: 25.03 KG/M2 | SYSTOLIC BLOOD PRESSURE: 133 MMHG

## 2021-02-19 DIAGNOSIS — R73.03 PREDIABETES: ICD-10-CM

## 2021-02-19 DIAGNOSIS — I10 HYPERTENSION, ESSENTIAL: ICD-10-CM

## 2021-02-19 DIAGNOSIS — E78.5 HYPERLIPIDEMIA, UNSPECIFIED HYPERLIPIDEMIA TYPE: Primary | ICD-10-CM

## 2021-02-19 PROBLEM — J98.8 VIRAL RESPIRATORY ILLNESS: Status: RESOLVED | Noted: 2020-01-23 | Resolved: 2021-02-19

## 2021-02-19 PROBLEM — B97.89 VIRAL RESPIRATORY ILLNESS: Status: RESOLVED | Noted: 2020-01-23 | Resolved: 2021-02-19

## 2021-02-19 PROCEDURE — 99214 OFFICE O/P EST MOD 30 MIN: CPT | Performed by: FAMILY MEDICINE

## 2021-02-19 RX ORDER — AMLODIPINE BESYLATE 10 MG/1
10 TABLET ORAL DAILY
Qty: 90 TABLET | Refills: 1 | Status: SHIPPED | OUTPATIENT
Start: 2021-02-19 | End: 2021-09-23 | Stop reason: SDUPTHER

## 2021-02-19 NOTE — ASSESSMENT & PLAN NOTE
She continues to maintain her weight and ideal body weight range  She has no symptoms of diabetes presently such as polyuria polydipsia polyphagia  Her last hemoglobin A1c this summer was in the low prediabetic range  We asked her to continue with her hiking / physical exercise as well as diet

## 2021-02-19 NOTE — PROGRESS NOTES
Virtual Regular Visit      Assessment/Plan:    Problem List Items Addressed This Visit        Cardiovascular and Mediastinum    Hypertension, essential       Her blood pressure by home measurement today is excellent  She will continue with her current regimen of amlodipine which was refilled today  Relevant Medications    amLODIPine (NORVASC) 10 mg tablet       Other    Prediabetes       She continues to maintain her weight and ideal body weight range  She has no symptoms of diabetes presently such as polyuria polydipsia polyphagia  Her last hemoglobin A1c this summer was in the low prediabetic range  We asked her to continue with her hiking / physical exercise as well as diet  Hyperlipidemia - Primary       She continues to decline treatment of her hyperlipidemia  Reason for visit is   Chief Complaint   Patient presents with    Follow-up     6 mo med check    Virtual Regular Visit        Encounter provider Khurram Villaseñor MD    Provider located at [address]  [address] [facility] [id number]      Recent Visits  No visits were found meeting these conditions  Showing recent visits within past 7 days and meeting all other requirements     Today's Visits  Date Type Provider Dept   02/19/21 Telemedicine Khurram Villaseñor MD [facility]   Showing today's visits and meeting all other requirements     Future Appointments  No visits were found meeting these conditions  Showing future appointments within next 150 days and meeting all other requirements        The patient was identified by name and date of birth  Corina Chavo was informed that this is a telemedicine visit and that the visit is being conducted through [facility] and patient was informed that this is a secure, HIPAA-compliant platform  She agrees to proceed     My office door was closed  No one else was in the room    She acknowledged consent and understanding of privacy and security of the video platform  The patient has agreed to participate and understands they can discontinue the visit at any time  Patient is aware this is a billable service  Subjective  Hector Barksdale is a 78 y o  female    HPI     The patient is a 62-year-old female who presents today for a virtual visit for routine follow-up of multiple medical problems including essential hypertension and hyperlipidemia as well as prediabetes  She states that she has been doing well  She was having issues with the pandemic but now she is taking an online [place] class and this seems to be occupying are times she is very excited by it  She denies any cardiovascular symptoms  She does note that she has some dyspnea with hiking which resolved quickly  She has no cough or hemoptysis  No PND orthopnea or edema  She has no GI or  complaint  No headache diplopia or focal neurologic symptom  No past medical history on file  No past surgical history on file  Current Outpatient Medications   Medication Sig Dispense Refill    amLODIPine (NORVASC) 10 mg tablet Take 1 tablet (10 mg total) by mouth daily 90 tablet 1     No current facility-administered medications for this visit  No Known Allergies    Review of Systems   Constitutional: Negative for activity change, appetite change, fatigue and unexpected weight change  Respiratory: Positive for shortness of breath  Mild SOB with hiking  No wheeze or nocturnal awakenings  No cough   Cardiovascular: Negative  Gastrointestinal: Negative for constipation and diarrhea  Genitourinary: Negative  Neurological: Negative for dizziness and headaches  Hematological: Negative for adenopathy  Does not bruise/bleed easily  Psychiatric/Behavioral: Negative for dysphoric mood, sleep disturbance and suicidal ideas  The patient is not nervous/anxious          Video Exam    Vitals:    02/19/21 1110   BP: 133/65   Weight: 61 7 kg (136 lb)   Height: 5' 2" (1 575 m)       Physical Exam  Constitutional:       Appearance: Normal appearance  Eyes:      Conjunctiva/sclera: Conjunctivae normal    Pulmonary:      Effort: Pulmonary effort is normal  No respiratory distress  Neurological:      Mental Status: She is alert and oriented to person, place, and time  Psychiatric:         Mood and Affect: Mood normal          Thought Content: Thought content normal          Judgment: Judgment normal           I spent 15 minutes directly with the patient during this visit      VIRTUAL VISIT DISCLAIMER    Darienpee Rod acknowledges that she has consented to an online visit or consultation  She understands that the online visit is based solely on information provided by her, and that, in the absence of a face-to-face physical evaluation by the physician, the diagnosis she receives is both limited and provisional in terms of accuracy and completeness  This is not intended to replace a full medical face-to-face evaluation by the physician  Darien Rod understands and accepts these terms

## 2021-02-19 NOTE — ASSESSMENT & PLAN NOTE
Her blood pressure by home measurement today is excellent  She will continue with her current regimen of amlodipine which was refilled today

## 2021-04-13 ENCOUNTER — IMMUNIZATIONS (OUTPATIENT)
Dept: FAMILY MEDICINE CLINIC | Facility: HOSPITAL | Age: 76
End: 2021-04-13

## 2021-04-13 DIAGNOSIS — Z23 ENCOUNTER FOR IMMUNIZATION: Primary | ICD-10-CM

## 2021-04-13 PROCEDURE — 0001A SARS-COV-2 / COVID-19 MRNA VACCINE (PFIZER-BIONTECH) 30 MCG: CPT

## 2021-04-13 PROCEDURE — 91300 SARS-COV-2 / COVID-19 MRNA VACCINE (PFIZER-BIONTECH) 30 MCG: CPT

## 2021-05-06 ENCOUNTER — IMMUNIZATIONS (OUTPATIENT)
Dept: FAMILY MEDICINE CLINIC | Facility: HOSPITAL | Age: 76
End: 2021-05-06

## 2021-05-06 DIAGNOSIS — Z23 ENCOUNTER FOR IMMUNIZATION: Primary | ICD-10-CM

## 2021-05-06 PROCEDURE — 0002A SARS-COV-2 / COVID-19 MRNA VACCINE (PFIZER-BIONTECH) 30 MCG: CPT

## 2021-05-06 PROCEDURE — 91300 SARS-COV-2 / COVID-19 MRNA VACCINE (PFIZER-BIONTECH) 30 MCG: CPT

## 2021-09-23 DIAGNOSIS — I10 HYPERTENSION, ESSENTIAL: ICD-10-CM

## 2021-09-23 RX ORDER — AMLODIPINE BESYLATE 10 MG/1
10 TABLET ORAL DAILY
Qty: 90 TABLET | Refills: 0 | Status: SHIPPED | OUTPATIENT
Start: 2021-09-23 | End: 2021-10-22 | Stop reason: SDUPTHER

## 2021-10-22 ENCOUNTER — OFFICE VISIT (OUTPATIENT)
Dept: FAMILY MEDICINE CLINIC | Facility: CLINIC | Age: 76
End: 2021-10-22
Payer: MEDICARE

## 2021-10-22 VITALS
WEIGHT: 152 LBS | HEIGHT: 62 IN | SYSTOLIC BLOOD PRESSURE: 136 MMHG | HEART RATE: 79 BPM | TEMPERATURE: 96.7 F | BODY MASS INDEX: 27.97 KG/M2 | DIASTOLIC BLOOD PRESSURE: 76 MMHG | OXYGEN SATURATION: 98 %

## 2021-10-22 DIAGNOSIS — R73.03 PREDIABETES: ICD-10-CM

## 2021-10-22 DIAGNOSIS — G89.29 CHRONIC PAIN OF RIGHT KNEE: ICD-10-CM

## 2021-10-22 DIAGNOSIS — M25.562 CHRONIC PAIN OF LEFT KNEE: ICD-10-CM

## 2021-10-22 DIAGNOSIS — R06.02 SOB (SHORTNESS OF BREATH): ICD-10-CM

## 2021-10-22 DIAGNOSIS — G89.29 CHRONIC PAIN OF LEFT KNEE: ICD-10-CM

## 2021-10-22 DIAGNOSIS — M25.551 HIP PAIN, CHRONIC, RIGHT: ICD-10-CM

## 2021-10-22 DIAGNOSIS — M25.561 CHRONIC PAIN OF RIGHT KNEE: ICD-10-CM

## 2021-10-22 DIAGNOSIS — E78.5 HYPERLIPIDEMIA, UNSPECIFIED HYPERLIPIDEMIA TYPE: ICD-10-CM

## 2021-10-22 DIAGNOSIS — G89.29 HIP PAIN, CHRONIC, RIGHT: ICD-10-CM

## 2021-10-22 DIAGNOSIS — I10 HYPERTENSION, ESSENTIAL: Primary | ICD-10-CM

## 2021-10-22 PROCEDURE — 99214 OFFICE O/P EST MOD 30 MIN: CPT | Performed by: FAMILY MEDICINE

## 2021-10-22 RX ORDER — AMLODIPINE BESYLATE 10 MG/1
10 TABLET ORAL DAILY
Qty: 90 TABLET | Refills: 1 | Status: SHIPPED | OUTPATIENT
Start: 2021-10-22 | End: 2022-06-15 | Stop reason: SDUPTHER

## 2021-10-22 RX ORDER — AMLODIPINE BESYLATE 10 MG/1
10 TABLET ORAL DAILY
Qty: 90 TABLET | Refills: 0 | Status: SHIPPED | OUTPATIENT
Start: 2021-10-22 | End: 2021-10-22 | Stop reason: SDUPTHER

## 2021-12-07 ENCOUNTER — APPOINTMENT (EMERGENCY)
Dept: RADIOLOGY | Facility: HOSPITAL | Age: 76
End: 2021-12-07
Payer: MEDICARE

## 2021-12-07 ENCOUNTER — APPOINTMENT (EMERGENCY)
Dept: CT IMAGING | Facility: HOSPITAL | Age: 76
End: 2021-12-07
Payer: MEDICARE

## 2021-12-07 ENCOUNTER — HOSPITAL ENCOUNTER (EMERGENCY)
Facility: HOSPITAL | Age: 76
Discharge: HOME | End: 2021-12-07
Attending: EMERGENCY MEDICINE | Admitting: EMERGENCY MEDICINE
Payer: MEDICARE

## 2021-12-07 VITALS
RESPIRATION RATE: 18 BRPM | SYSTOLIC BLOOD PRESSURE: 151 MMHG | TEMPERATURE: 98.8 F | HEART RATE: 90 BPM | OXYGEN SATURATION: 98 % | WEIGHT: 151.9 LBS | DIASTOLIC BLOOD PRESSURE: 91 MMHG | BODY MASS INDEX: 27.78 KG/M2

## 2021-12-07 DIAGNOSIS — H53.9 VISUAL CHANGES: Primary | ICD-10-CM

## 2021-12-07 LAB
ALBUMIN SERPL BCP-MCNC: 4.2 G/DL (ref 3.5–5)
ALP SERPL-CCNC: 143 U/L (ref 46–116)
ALT SERPL W P-5'-P-CCNC: 33 U/L (ref 12–78)
ANION GAP SERPL CALCULATED.3IONS-SCNC: 15 MMOL/L (ref 4–13)
AST SERPL W P-5'-P-CCNC: 15 U/L (ref 5–45)
BASOPHILS # BLD AUTO: 0.08 THOUSANDS/ΜL (ref 0–0.1)
BASOPHILS NFR BLD AUTO: 1 % (ref 0–1)
BILIRUB SERPL-MCNC: 0.4 MG/DL (ref 0.2–1)
BUN SERPL-MCNC: 14 MG/DL (ref 5–25)
CALCIUM SERPL-MCNC: 9.4 MG/DL (ref 8.3–10.1)
CARDIAC TROPONIN I PNL SERPL HS: 4 NG/L
CHLORIDE SERPL-SCNC: 101 MMOL/L (ref 100–108)
CO2 SERPL-SCNC: 23 MMOL/L (ref 21–32)
CREAT SERPL-MCNC: 0.96 MG/DL (ref 0.6–1.3)
EOSINOPHIL # BLD AUTO: 0.12 THOUSAND/ΜL (ref 0–0.61)
EOSINOPHIL NFR BLD AUTO: 1 % (ref 0–6)
ERYTHROCYTE [DISTWIDTH] IN BLOOD BY AUTOMATED COUNT: 12.9 % (ref 11.6–15.1)
GFR SERPL CREATININE-BSD FRML MDRD: 58 ML/MIN/1.73SQ M
GLUCOSE SERPL-MCNC: 122 MG/DL (ref 65–140)
HCT VFR BLD AUTO: 43.3 % (ref 34.8–46.1)
HGB BLD-MCNC: 14.4 G/DL (ref 11.5–15.4)
IMM GRANULOCYTES # BLD AUTO: 0.03 THOUSAND/UL (ref 0–0.2)
IMM GRANULOCYTES NFR BLD AUTO: 0 % (ref 0–2)
LYMPHOCYTES # BLD AUTO: 3.12 THOUSANDS/ΜL (ref 0.6–4.47)
LYMPHOCYTES NFR BLD AUTO: 30 % (ref 14–44)
MCH RBC QN AUTO: 30.6 PG (ref 26.8–34.3)
MCHC RBC AUTO-ENTMCNC: 33.3 G/DL (ref 31.4–37.4)
MCV RBC AUTO: 92 FL (ref 82–98)
MONOCYTES # BLD AUTO: 0.81 THOUSAND/ΜL (ref 0.17–1.22)
MONOCYTES NFR BLD AUTO: 8 % (ref 4–12)
NEUTROPHILS # BLD AUTO: 6.3 THOUSANDS/ΜL (ref 1.85–7.62)
NEUTS SEG NFR BLD AUTO: 60 % (ref 43–75)
NRBC BLD AUTO-RTO: 0 /100 WBCS
PLATELET # BLD AUTO: 339 THOUSANDS/UL (ref 149–390)
PMV BLD AUTO: 8.8 FL (ref 8.9–12.7)
POTASSIUM SERPL-SCNC: 3.5 MMOL/L (ref 3.5–5.3)
PROT SERPL-MCNC: 8.9 G/DL (ref 6.4–8.2)
RBC # BLD AUTO: 4.71 MILLION/UL (ref 3.81–5.12)
SODIUM SERPL-SCNC: 139 MMOL/L (ref 136–145)
WBC # BLD AUTO: 10.46 THOUSAND/UL (ref 4.31–10.16)

## 2021-12-07 PROCEDURE — 93005 ELECTROCARDIOGRAM TRACING: CPT

## 2021-12-07 PROCEDURE — 85025 COMPLETE CBC W/AUTO DIFF WBC: CPT | Performed by: EMERGENCY MEDICINE

## 2021-12-07 PROCEDURE — 71046 X-RAY EXAM CHEST 2 VIEWS: CPT

## 2021-12-07 PROCEDURE — 99284 EMERGENCY DEPT VISIT MOD MDM: CPT | Performed by: EMERGENCY MEDICINE

## 2021-12-07 PROCEDURE — 84484 ASSAY OF TROPONIN QUANT: CPT | Performed by: EMERGENCY MEDICINE

## 2021-12-07 PROCEDURE — 80053 COMPREHEN METABOLIC PANEL: CPT | Performed by: EMERGENCY MEDICINE

## 2021-12-07 PROCEDURE — 99284 EMERGENCY DEPT VISIT MOD MDM: CPT

## 2021-12-07 PROCEDURE — 70450 CT HEAD/BRAIN W/O DYE: CPT

## 2021-12-07 PROCEDURE — 36415 COLL VENOUS BLD VENIPUNCTURE: CPT | Performed by: EMERGENCY MEDICINE

## 2021-12-08 ENCOUNTER — VBI (OUTPATIENT)
Dept: ADMINISTRATIVE | Facility: OTHER | Age: 76
End: 2021-12-08

## 2021-12-09 ENCOUNTER — OFFICE VISIT (OUTPATIENT)
Dept: FAMILY MEDICINE CLINIC | Facility: CLINIC | Age: 76
End: 2021-12-09
Payer: MEDICARE

## 2021-12-09 VITALS
BODY MASS INDEX: 27.79 KG/M2 | OXYGEN SATURATION: 98 % | DIASTOLIC BLOOD PRESSURE: 92 MMHG | SYSTOLIC BLOOD PRESSURE: 144 MMHG | HEIGHT: 62 IN | HEART RATE: 87 BPM | TEMPERATURE: 99.2 F | WEIGHT: 151 LBS

## 2021-12-09 DIAGNOSIS — N18.30 STAGE 3 CHRONIC KIDNEY DISEASE, UNSPECIFIED WHETHER STAGE 3A OR 3B CKD (HCC): ICD-10-CM

## 2021-12-09 DIAGNOSIS — H53.123 TRANSIENT VISUAL LOSS, BILATERAL: ICD-10-CM

## 2021-12-09 DIAGNOSIS — Z11.59 NEED FOR HEPATITIS C SCREENING TEST: ICD-10-CM

## 2021-12-09 DIAGNOSIS — H53.9 VISUAL DISTURBANCE: ICD-10-CM

## 2021-12-09 DIAGNOSIS — R42 VERTIGO: Primary | ICD-10-CM

## 2021-12-09 PROCEDURE — 99215 OFFICE O/P EST HI 40 MIN: CPT | Performed by: FAMILY MEDICINE

## 2021-12-09 PROCEDURE — 36415 COLL VENOUS BLD VENIPUNCTURE: CPT | Performed by: FAMILY MEDICINE

## 2021-12-11 ENCOUNTER — APPOINTMENT (INPATIENT)
Dept: RADIOLOGY | Facility: HOSPITAL | Age: 76
DRG: 065 | End: 2021-12-11
Payer: MEDICARE

## 2021-12-11 ENCOUNTER — HOSPITAL ENCOUNTER (OUTPATIENT)
Dept: RADIOLOGY | Facility: HOSPITAL | Age: 76
Discharge: HOME | DRG: 065 | End: 2021-12-11
Payer: MEDICARE

## 2021-12-11 ENCOUNTER — HOSPITAL ENCOUNTER (INPATIENT)
Facility: HOSPITAL | Age: 76
LOS: 3 days | Discharge: HOME | DRG: 065 | End: 2021-12-14
Attending: EMERGENCY MEDICINE | Admitting: PSYCHIATRY & NEUROLOGY
Payer: MEDICARE

## 2021-12-11 DIAGNOSIS — H53.133: ICD-10-CM

## 2021-12-11 DIAGNOSIS — E78.5 HYPERLIPIDEMIA, UNSPECIFIED HYPERLIPIDEMIA TYPE: ICD-10-CM

## 2021-12-11 DIAGNOSIS — R42 VERTIGO: ICD-10-CM

## 2021-12-11 DIAGNOSIS — H53.9 VISUAL DISTURBANCE: ICD-10-CM

## 2021-12-11 DIAGNOSIS — G89.29 CHRONIC PAIN OF RIGHT KNEE: ICD-10-CM

## 2021-12-11 DIAGNOSIS — M25.561 CHRONIC PAIN OF RIGHT KNEE: ICD-10-CM

## 2021-12-11 DIAGNOSIS — G89.29 HIP PAIN, CHRONIC, RIGHT: ICD-10-CM

## 2021-12-11 DIAGNOSIS — R73.03 PREDIABETES: ICD-10-CM

## 2021-12-11 DIAGNOSIS — R06.02 SOB (SHORTNESS OF BREATH): ICD-10-CM

## 2021-12-11 DIAGNOSIS — I63.40 EMBOLIC CEREBRAL INFARCTION (HCC): ICD-10-CM

## 2021-12-11 DIAGNOSIS — M25.551 HIP PAIN, CHRONIC, RIGHT: ICD-10-CM

## 2021-12-11 DIAGNOSIS — R42 DIZZINESS: ICD-10-CM

## 2021-12-11 DIAGNOSIS — I10 HYPERTENSION, ESSENTIAL: ICD-10-CM

## 2021-12-11 DIAGNOSIS — I63.9 STROKE (HCC): Primary | ICD-10-CM

## 2021-12-11 DIAGNOSIS — N18.30 STAGE 3 CHRONIC KIDNEY DISEASE, UNSPECIFIED WHETHER STAGE 3A OR 3B CKD (HCC): ICD-10-CM

## 2021-12-11 LAB
2HR DELTA HS TROPONIN: 0 NG/L
ALBUMIN SERPL BCP-MCNC: 4.2 G/DL (ref 3.5–5)
ALP SERPL-CCNC: 137 U/L (ref 46–116)
ALT SERPL W P-5'-P-CCNC: 28 U/L (ref 12–78)
ANION GAP SERPL CALCULATED.3IONS-SCNC: 8 MMOL/L (ref 4–13)
APTT PPP: 26 SECONDS (ref 23–37)
AST SERPL W P-5'-P-CCNC: 19 U/L (ref 5–45)
BASOPHILS # BLD AUTO: 0.06 THOUSANDS/ΜL (ref 0–0.1)
BASOPHILS NFR BLD AUTO: 1 % (ref 0–1)
BILIRUB SERPL-MCNC: 0.46 MG/DL (ref 0.2–1)
BUN SERPL-MCNC: 17 MG/DL (ref 5–25)
CALCIUM SERPL-MCNC: 10.4 MG/DL (ref 8.3–10.1)
CARDIAC TROPONIN I PNL SERPL HS: 4 NG/L
CARDIAC TROPONIN I PNL SERPL HS: 4 NG/L
CHLORIDE SERPL-SCNC: 102 MMOL/L (ref 100–108)
CHOLEST SERPL-MCNC: 315 MG/DL
CO2 SERPL-SCNC: 24 MMOL/L (ref 21–32)
CREAT SERPL-MCNC: 1.34 MG/DL (ref 0.6–1.3)
EOSINOPHIL # BLD AUTO: 0.05 THOUSAND/ΜL (ref 0–0.61)
EOSINOPHIL NFR BLD AUTO: 0 % (ref 0–6)
ERYTHROCYTE [DISTWIDTH] IN BLOOD BY AUTOMATED COUNT: 12.9 % (ref 11.6–15.1)
EST. AVERAGE GLUCOSE BLD GHB EST-MCNC: 123 MG/DL
GFR SERPL CREATININE-BSD FRML MDRD: 39 ML/MIN/1.73SQ M
GLUCOSE SERPL-MCNC: 151 MG/DL (ref 65–140)
HBA1C MFR BLD: 5.9 %
HCT VFR BLD AUTO: 43.5 % (ref 34.8–46.1)
HDLC SERPL-MCNC: 40 MG/DL
HGB BLD-MCNC: 15.1 G/DL (ref 11.5–15.4)
IMM GRANULOCYTES # BLD AUTO: 0.05 THOUSAND/UL (ref 0–0.2)
IMM GRANULOCYTES NFR BLD AUTO: 0 % (ref 0–2)
INR PPP: 0.89 (ref 0.84–1.19)
LDLC SERPL CALC-MCNC: 235 MG/DL (ref 0–100)
LYMPHOCYTES # BLD AUTO: 2.38 THOUSANDS/ΜL (ref 0.6–4.47)
LYMPHOCYTES NFR BLD AUTO: 21 % (ref 14–44)
MCH RBC QN AUTO: 31.6 PG (ref 26.8–34.3)
MCHC RBC AUTO-ENTMCNC: 34.7 G/DL (ref 31.4–37.4)
MCV RBC AUTO: 91 FL (ref 82–98)
MONOCYTES # BLD AUTO: 0.78 THOUSAND/ΜL (ref 0.17–1.22)
MONOCYTES NFR BLD AUTO: 7 % (ref 4–12)
NEUTROPHILS # BLD AUTO: 7.85 THOUSANDS/ΜL (ref 1.85–7.62)
NEUTS SEG NFR BLD AUTO: 71 % (ref 43–75)
NONHDLC SERPL-MCNC: 275 MG/DL
NRBC BLD AUTO-RTO: 0 /100 WBCS
PLATELET # BLD AUTO: 330 THOUSANDS/UL (ref 149–390)
PMV BLD AUTO: 8.9 FL (ref 8.9–12.7)
POTASSIUM SERPL-SCNC: 3.6 MMOL/L (ref 3.5–5.3)
PROT SERPL-MCNC: 8.6 G/DL (ref 6.4–8.2)
PROTHROMBIN TIME: 11.7 SECONDS (ref 11.6–14.5)
RBC # BLD AUTO: 4.78 MILLION/UL (ref 3.81–5.12)
SODIUM SERPL-SCNC: 134 MMOL/L (ref 136–145)
TRIGL SERPL-MCNC: 200 MG/DL
WBC # BLD AUTO: 11.17 THOUSAND/UL (ref 4.31–10.16)

## 2021-12-11 PROCEDURE — 80061 LIPID PANEL: CPT | Performed by: EMERGENCY MEDICINE

## 2021-12-11 PROCEDURE — NC001 PR NO CHARGE: Performed by: PSYCHIATRY & NEUROLOGY

## 2021-12-11 PROCEDURE — G1004 CDSM NDSC: HCPCS

## 2021-12-11 PROCEDURE — 70496 CT ANGIOGRAPHY HEAD: CPT

## 2021-12-11 PROCEDURE — 93005 ELECTROCARDIOGRAM TRACING: CPT

## 2021-12-11 PROCEDURE — 99285 EMERGENCY DEPT VISIT HI MDM: CPT

## 2021-12-11 PROCEDURE — 85730 THROMBOPLASTIN TIME PARTIAL: CPT | Performed by: EMERGENCY MEDICINE

## 2021-12-11 PROCEDURE — 85610 PROTHROMBIN TIME: CPT | Performed by: EMERGENCY MEDICINE

## 2021-12-11 PROCEDURE — 85025 COMPLETE CBC W/AUTO DIFF WBC: CPT | Performed by: EMERGENCY MEDICINE

## 2021-12-11 PROCEDURE — 83036 HEMOGLOBIN GLYCOSYLATED A1C: CPT | Performed by: EMERGENCY MEDICINE

## 2021-12-11 PROCEDURE — 99285 EMERGENCY DEPT VISIT HI MDM: CPT | Performed by: EMERGENCY MEDICINE

## 2021-12-11 PROCEDURE — 70551 MRI BRAIN STEM W/O DYE: CPT

## 2021-12-11 PROCEDURE — 1123F ACP DISCUSS/DSCN MKR DOCD: CPT | Performed by: PSYCHIATRY & NEUROLOGY

## 2021-12-11 PROCEDURE — 84484 ASSAY OF TROPONIN QUANT: CPT | Performed by: EMERGENCY MEDICINE

## 2021-12-11 PROCEDURE — 80053 COMPREHEN METABOLIC PANEL: CPT | Performed by: EMERGENCY MEDICINE

## 2021-12-11 PROCEDURE — 99223 1ST HOSP IP/OBS HIGH 75: CPT | Performed by: PSYCHIATRY & NEUROLOGY

## 2021-12-11 PROCEDURE — 71046 X-RAY EXAM CHEST 2 VIEWS: CPT

## 2021-12-11 PROCEDURE — 70498 CT ANGIOGRAPHY NECK: CPT

## 2021-12-11 PROCEDURE — 36415 COLL VENOUS BLD VENIPUNCTURE: CPT | Performed by: EMERGENCY MEDICINE

## 2021-12-11 PROCEDURE — 99222 1ST HOSP IP/OBS MODERATE 55: CPT | Performed by: INTERNAL MEDICINE

## 2021-12-11 RX ORDER — ASPIRIN 81 MG/1
81 TABLET ORAL DAILY
Status: DISCONTINUED | OUTPATIENT
Start: 2021-12-12 | End: 2021-12-14 | Stop reason: HOSPADM

## 2021-12-11 RX ORDER — ATORVASTATIN CALCIUM 80 MG/1
80 TABLET, FILM COATED ORAL EVERY EVENING
Status: DISCONTINUED | OUTPATIENT
Start: 2021-12-11 | End: 2021-12-14 | Stop reason: HOSPADM

## 2021-12-11 RX ORDER — SODIUM CHLORIDE 9 MG/ML
100 INJECTION, SOLUTION INTRAVENOUS CONTINUOUS
Status: DISCONTINUED | OUTPATIENT
Start: 2021-12-11 | End: 2021-12-12

## 2021-12-11 RX ORDER — MECLIZINE HYDROCHLORIDE 25 MG/1
25 TABLET ORAL EVERY 8 HOURS PRN
Status: DISCONTINUED | OUTPATIENT
Start: 2021-12-11 | End: 2021-12-14 | Stop reason: HOSPADM

## 2021-12-11 RX ORDER — ASPIRIN 325 MG
325 TABLET ORAL ONCE
Status: COMPLETED | OUTPATIENT
Start: 2021-12-11 | End: 2021-12-11

## 2021-12-11 RX ORDER — NICOTINE 21 MG/24HR
1 PATCH, TRANSDERMAL 24 HOURS TRANSDERMAL DAILY
Status: DISCONTINUED | OUTPATIENT
Start: 2021-12-12 | End: 2021-12-14 | Stop reason: HOSPADM

## 2021-12-11 RX ORDER — AMLODIPINE BESYLATE 10 MG/1
10 TABLET ORAL DAILY
Status: DISCONTINUED | OUTPATIENT
Start: 2021-12-12 | End: 2021-12-14 | Stop reason: HOSPADM

## 2021-12-11 RX ORDER — MECLIZINE HYDROCHLORIDE 25 MG/1
25 TABLET ORAL ONCE
Status: COMPLETED | OUTPATIENT
Start: 2021-12-11 | End: 2021-12-11

## 2021-12-11 RX ORDER — ATORVASTATIN CALCIUM 20 MG/1
40 TABLET, FILM COATED ORAL EVERY EVENING
Status: DISCONTINUED | OUTPATIENT
Start: 2021-12-11 | End: 2021-12-11

## 2021-12-11 RX ORDER — HEPARIN SODIUM 5000 [USP'U]/ML
5000 INJECTION, SOLUTION INTRAVENOUS; SUBCUTANEOUS EVERY 8 HOURS SCHEDULED
Status: DISCONTINUED | OUTPATIENT
Start: 2021-12-11 | End: 2021-12-14 | Stop reason: HOSPADM

## 2021-12-11 RX ADMIN — SODIUM CHLORIDE 100 ML/HR: 0.9 INJECTION, SOLUTION INTRAVENOUS at 16:59

## 2021-12-11 RX ADMIN — IOHEXOL 85 ML: 350 INJECTION, SOLUTION INTRAVENOUS at 09:49

## 2021-12-11 RX ADMIN — ASPIRIN 325 MG ORAL TABLET 325 MG: 325 PILL ORAL at 12:22

## 2021-12-11 RX ADMIN — ATORVASTATIN CALCIUM 80 MG: 80 TABLET, FILM COATED ORAL at 17:30

## 2021-12-11 RX ADMIN — MECLIZINE HYDROCHLORIDE 25 MG: 25 TABLET ORAL at 08:53

## 2021-12-11 RX ADMIN — SODIUM CHLORIDE 500 ML: 0.9 INJECTION, SOLUTION INTRAVENOUS at 12:19

## 2021-12-11 RX ADMIN — MECLIZINE HYDROCHLORIDE 25 MG: 25 TABLET ORAL at 22:37

## 2021-12-12 ENCOUNTER — APPOINTMENT (INPATIENT)
Dept: NON INVASIVE DIAGNOSTICS | Facility: HOSPITAL | Age: 76
DRG: 065 | End: 2021-12-12
Payer: MEDICARE

## 2021-12-12 LAB
ATRIAL RATE: 88 BPM
P AXIS: 69 DEGREES
PR INTERVAL: 140 MS
QRS AXIS: 6 DEGREES
QRSD INTERVAL: 86 MS
QT INTERVAL: 358 MS
QTC INTERVAL: 433 MS
T WAVE AXIS: 196 DEGREES
VENTRICULAR RATE: 88 BPM

## 2021-12-12 PROCEDURE — 93010 ELECTROCARDIOGRAM REPORT: CPT | Performed by: INTERNAL MEDICINE

## 2021-12-12 PROCEDURE — 97166 OT EVAL MOD COMPLEX 45 MIN: CPT

## 2021-12-12 PROCEDURE — 99232 SBSQ HOSP IP/OBS MODERATE 35: CPT | Performed by: INTERNAL MEDICINE

## 2021-12-12 PROCEDURE — 99232 SBSQ HOSP IP/OBS MODERATE 35: CPT | Performed by: PSYCHIATRY & NEUROLOGY

## 2021-12-12 PROCEDURE — 97162 PT EVAL MOD COMPLEX 30 MIN: CPT

## 2021-12-12 RX ADMIN — MECLIZINE HYDROCHLORIDE 25 MG: 25 TABLET ORAL at 17:41

## 2021-12-12 RX ADMIN — ASPIRIN 81 MG: 81 TABLET, COATED ORAL at 09:08

## 2021-12-12 RX ADMIN — AMLODIPINE BESYLATE 10 MG: 10 TABLET ORAL at 09:08

## 2021-12-12 RX ADMIN — ATORVASTATIN CALCIUM 80 MG: 80 TABLET, FILM COATED ORAL at 17:36

## 2021-12-13 ENCOUNTER — ANESTHESIA EVENT (INPATIENT)
Dept: NON INVASIVE DIAGNOSTICS | Facility: HOSPITAL | Age: 76
DRG: 065 | End: 2021-12-13
Payer: MEDICARE

## 2021-12-13 LAB
ATRIAL RATE: 72 BPM
DEPRECATED AT III PPP: 121 % OF NORMAL (ref 92–136)
P AXIS: 56 DEGREES
PR INTERVAL: 142 MS
PROT C AG ACT/NOR PPP IA: 142 % OF NORMAL (ref 60–150)
PROT S ACT/NOR PPP: 137 % (ref 68–108)
QRS AXIS: -13 DEGREES
QRSD INTERVAL: 82 MS
QT INTERVAL: 370 MS
QTC INTERVAL: 405 MS
T WAVE AXIS: 101 DEGREES
VENTRICULAR RATE: 72 BPM

## 2021-12-13 PROCEDURE — 85670 THROMBIN TIME PLASMA: CPT | Performed by: PSYCHIATRY & NEUROLOGY

## 2021-12-13 PROCEDURE — 99232 SBSQ HOSP IP/OBS MODERATE 35: CPT | Performed by: INTERNAL MEDICINE

## 2021-12-13 PROCEDURE — 85732 THROMBOPLASTIN TIME PARTIAL: CPT | Performed by: PSYCHIATRY & NEUROLOGY

## 2021-12-13 PROCEDURE — 85300 ANTITHROMBIN III ACTIVITY: CPT | Performed by: PSYCHIATRY & NEUROLOGY

## 2021-12-13 PROCEDURE — B24BZZ4 ULTRASONOGRAPHY OF HEART WITH AORTA, TRANSESOPHAGEAL: ICD-10-PCS | Performed by: INTERNAL MEDICINE

## 2021-12-13 PROCEDURE — 93010 ELECTROCARDIOGRAM REPORT: CPT | Performed by: INTERNAL MEDICINE

## 2021-12-13 PROCEDURE — 93312 ECHO TRANSESOPHAGEAL: CPT

## 2021-12-13 PROCEDURE — 81241 F5 GENE: CPT | Performed by: PSYCHIATRY & NEUROLOGY

## 2021-12-13 PROCEDURE — 85306 CLOT INHIBIT PROT S FREE: CPT | Performed by: PSYCHIATRY & NEUROLOGY

## 2021-12-13 PROCEDURE — 85613 RUSSELL VIPER VENOM DILUTED: CPT | Performed by: PSYCHIATRY & NEUROLOGY

## 2021-12-13 PROCEDURE — 86146 BETA-2 GLYCOPROTEIN ANTIBODY: CPT | Performed by: PSYCHIATRY & NEUROLOGY

## 2021-12-13 PROCEDURE — 99223 1ST HOSP IP/OBS HIGH 75: CPT | Performed by: STUDENT IN AN ORGANIZED HEALTH CARE EDUCATION/TRAINING PROGRAM

## 2021-12-13 PROCEDURE — 86147 CARDIOLIPIN ANTIBODY EA IG: CPT | Performed by: PSYCHIATRY & NEUROLOGY

## 2021-12-13 PROCEDURE — 85305 CLOT INHIBIT PROT S TOTAL: CPT | Performed by: PSYCHIATRY & NEUROLOGY

## 2021-12-13 PROCEDURE — 85705 THROMBOPLASTIN INHIBITION: CPT | Performed by: PSYCHIATRY & NEUROLOGY

## 2021-12-13 PROCEDURE — 85303 CLOT INHIBIT PROT C ACTIVITY: CPT | Performed by: PSYCHIATRY & NEUROLOGY

## 2021-12-13 PROCEDURE — 81240 F2 GENE: CPT | Performed by: PSYCHIATRY & NEUROLOGY

## 2021-12-13 PROCEDURE — 99232 SBSQ HOSP IP/OBS MODERATE 35: CPT | Performed by: PSYCHIATRY & NEUROLOGY

## 2021-12-13 RX ORDER — SODIUM CHLORIDE 9 MG/ML
INJECTION, SOLUTION INTRAVENOUS CONTINUOUS PRN
Status: DISCONTINUED | OUTPATIENT
Start: 2021-12-13 | End: 2021-12-13

## 2021-12-13 RX ORDER — PROPOFOL 10 MG/ML
INJECTION, EMULSION INTRAVENOUS CONTINUOUS PRN
Status: DISCONTINUED | OUTPATIENT
Start: 2021-12-13 | End: 2021-12-13

## 2021-12-13 RX ORDER — PROPOFOL 10 MG/ML
INJECTION, EMULSION INTRAVENOUS AS NEEDED
Status: DISCONTINUED | OUTPATIENT
Start: 2021-12-13 | End: 2021-12-13

## 2021-12-13 RX ORDER — FENTANYL CITRATE 50 UG/ML
INJECTION, SOLUTION INTRAMUSCULAR; INTRAVENOUS AS NEEDED
Status: DISCONTINUED | OUTPATIENT
Start: 2021-12-13 | End: 2021-12-13

## 2021-12-13 RX ADMIN — ASPIRIN 81 MG: 81 TABLET, COATED ORAL at 14:56

## 2021-12-13 RX ADMIN — ATORVASTATIN CALCIUM 80 MG: 80 TABLET, FILM COATED ORAL at 17:22

## 2021-12-13 RX ADMIN — MECLIZINE HYDROCHLORIDE 25 MG: 25 TABLET ORAL at 15:02

## 2021-12-13 RX ADMIN — FENTANYL CITRATE 25 MCG: 50 INJECTION INTRAMUSCULAR; INTRAVENOUS at 13:24

## 2021-12-13 RX ADMIN — AMLODIPINE BESYLATE 10 MG: 10 TABLET ORAL at 14:55

## 2021-12-13 RX ADMIN — SODIUM CHLORIDE: 0.9 INJECTION, SOLUTION INTRAVENOUS at 12:48

## 2021-12-13 RX ADMIN — PROPOFOL 130 MCG/KG/MIN: 10 INJECTION, EMULSION INTRAVENOUS at 13:29

## 2021-12-13 RX ADMIN — PROPOFOL 100 MG: 10 INJECTION, EMULSION INTRAVENOUS at 13:29

## 2021-12-13 RX ADMIN — FENTANYL CITRATE 25 MCG: 50 INJECTION INTRAMUSCULAR; INTRAVENOUS at 12:51

## 2021-12-13 RX ADMIN — MECLIZINE HYDROCHLORIDE 25 MG: 25 TABLET ORAL at 23:13

## 2021-12-14 LAB
B BURGDOR AB SER QL IA: 3.42 INDEX
B BURGDOR IGG SER QL IB: NEGATIVE
B BURGDOR IGM SER QL IB: NEGATIVE
B BURGDOR18KD IGG SER QL IB: ABNORMAL
B BURGDOR23KD IGG SER QL IB: ABNORMAL
B BURGDOR23KD IGM SER QL IB: REACTIVE
B BURGDOR28KD IGG SER QL IB: ABNORMAL
B BURGDOR30KD IGG SER QL IB: ABNORMAL
B BURGDOR39KD IGG SER QL IB: ABNORMAL
B BURGDOR39KD IGM SER QL IB: ABNORMAL
B BURGDOR41KD IGG SER QL IB: REACTIVE
B BURGDOR41KD IGM SER QL IB: ABNORMAL
B BURGDOR45KD IGG SER QL IB: ABNORMAL
B BURGDOR58KD IGG SER QL IB: ABNORMAL
B BURGDOR66KD IGG SER QL IB: ABNORMAL
B BURGDOR93KD IGG SER QL IB: ABNORMAL
B2 GLYCOPROT1 IGA SERPL IA-ACNC: 3.4
B2 GLYCOPROT1 IGG SERPL IA-ACNC: 1.8
B2 GLYCOPROT1 IGM SERPL IA-ACNC: <2.9
CARDIOLIPIN IGA SER IA-ACNC: 2.6
CARDIOLIPIN IGG SER IA-ACNC: 1.3
CARDIOLIPIN IGM SER IA-ACNC: 2.1
CRP SERPL-MCNC: 2.9 MG/L
ERYTHROCYTE [SEDIMENTATION RATE] IN BLOOD BY WESTERGREN METHOD: 31 MM/H
HCV AB S/CO SERPL IA: <0.02
HCV AB SERPL QL IA: NORMAL
Lab: 80 M/S
SL CV LV EF: 60

## 2021-12-14 PROCEDURE — 99239 HOSP IP/OBS DSCHRG MGMT >30: CPT | Performed by: PSYCHIATRY & NEUROLOGY

## 2021-12-14 PROCEDURE — 93320 DOPPLER ECHO COMPLETE: CPT | Performed by: INTERNAL MEDICINE

## 2021-12-14 PROCEDURE — 93312 ECHO TRANSESOPHAGEAL: CPT | Performed by: INTERNAL MEDICINE

## 2021-12-14 PROCEDURE — 99232 SBSQ HOSP IP/OBS MODERATE 35: CPT | Performed by: PHYSICIAN ASSISTANT

## 2021-12-14 PROCEDURE — 93325 DOPPLER ECHO COLOR FLOW MAPG: CPT | Performed by: INTERNAL MEDICINE

## 2021-12-14 RX ORDER — ASPIRIN 81 MG/1
81 TABLET ORAL DAILY
Qty: 30 TABLET | Refills: 0 | Status: CANCELLED | OUTPATIENT
Start: 2021-12-15

## 2021-12-14 RX ORDER — ATORVASTATIN CALCIUM 80 MG/1
80 TABLET, FILM COATED ORAL EVERY EVENING
Qty: 30 TABLET | Refills: 0 | Status: CANCELLED | OUTPATIENT
Start: 2021-12-14

## 2021-12-14 RX ORDER — MECLIZINE HYDROCHLORIDE 25 MG/1
25 TABLET ORAL EVERY 8 HOURS PRN
Qty: 15 TABLET | Refills: 0 | Status: SHIPPED | OUTPATIENT
Start: 2021-12-14 | End: 2022-04-22 | Stop reason: ALTCHOICE

## 2021-12-14 RX ORDER — NICOTINE 21 MG/24HR
1 PATCH, TRANSDERMAL 24 HOURS TRANSDERMAL DAILY
Qty: 28 PATCH | Refills: 0 | Status: CANCELLED | OUTPATIENT
Start: 2021-12-15

## 2021-12-14 RX ORDER — NICOTINE 21 MG/24HR
1 PATCH, TRANSDERMAL 24 HOURS TRANSDERMAL DAILY
Qty: 28 PATCH | Refills: 0 | Status: SHIPPED | OUTPATIENT
Start: 2021-12-15 | End: 2022-04-22 | Stop reason: ALTCHOICE

## 2021-12-14 RX ORDER — ATORVASTATIN CALCIUM 80 MG/1
80 TABLET, FILM COATED ORAL EVERY EVENING
Qty: 40 TABLET | Refills: 0 | Status: SHIPPED | OUTPATIENT
Start: 2021-12-14 | End: 2022-01-11 | Stop reason: SDUPTHER

## 2021-12-14 RX ORDER — MECLIZINE HYDROCHLORIDE 25 MG/1
25 TABLET ORAL EVERY 8 HOURS PRN
Qty: 15 TABLET | Refills: 0 | Status: CANCELLED | OUTPATIENT
Start: 2021-12-14

## 2021-12-14 RX ORDER — ASPIRIN 81 MG/1
81 TABLET ORAL DAILY
Qty: 40 TABLET | Refills: 0 | Status: SHIPPED | OUTPATIENT
Start: 2021-12-15 | End: 2022-01-07 | Stop reason: SDUPTHER

## 2021-12-14 RX ADMIN — ASPIRIN 81 MG: 81 TABLET, COATED ORAL at 09:42

## 2021-12-14 RX ADMIN — AMLODIPINE BESYLATE 10 MG: 10 TABLET ORAL at 09:42

## 2021-12-14 RX ADMIN — MECLIZINE HYDROCHLORIDE 25 MG: 25 TABLET ORAL at 09:49

## 2021-12-15 ENCOUNTER — TRANSITIONAL CARE MANAGEMENT (OUTPATIENT)
Dept: FAMILY MEDICINE CLINIC | Facility: CLINIC | Age: 76
End: 2021-12-15

## 2021-12-15 LAB
APTT SCREEN TO CONFIRM RATIO: 1.06 RATIO (ref 0–1.34)
CONFIRM APTT/NORMAL: 38.2 SEC (ref 0–47.6)
LA PPP-IMP: NORMAL
PROT S ACT/NOR PPP: 135 % (ref 61–136)
PROT S PPP-ACNC: 146 % (ref 60–150)
SCREEN APTT: 37.9 SEC (ref 0–51.9)
SCREEN DRVVT: 35.5 SEC (ref 0–47)
THROMBIN TIME: 15.9 SEC (ref 0–23)

## 2021-12-16 LAB — F5 GENE MUT ANL BLD/T: NORMAL

## 2021-12-17 ENCOUNTER — TELEPHONE (OUTPATIENT)
Dept: NEUROLOGY | Facility: CLINIC | Age: 76
End: 2021-12-17

## 2021-12-17 VITALS
TEMPERATURE: 98.3 F | DIASTOLIC BLOOD PRESSURE: 70 MMHG | HEIGHT: 62 IN | BODY MASS INDEX: 27.79 KG/M2 | HEART RATE: 77 BPM | RESPIRATION RATE: 18 BRPM | SYSTOLIC BLOOD PRESSURE: 111 MMHG | OXYGEN SATURATION: 91 % | WEIGHT: 151 LBS

## 2021-12-17 LAB — F2 GENE MUT ANL BLD/T: NORMAL

## 2021-12-20 ENCOUNTER — PATIENT OUTREACH (OUTPATIENT)
Dept: FAMILY MEDICINE CLINIC | Facility: CLINIC | Age: 76
End: 2021-12-20

## 2021-12-20 ENCOUNTER — TELEPHONE (OUTPATIENT)
Dept: NEUROLOGY | Facility: CLINIC | Age: 76
End: 2021-12-20

## 2021-12-21 ENCOUNTER — OFFICE VISIT (OUTPATIENT)
Dept: FAMILY MEDICINE CLINIC | Facility: CLINIC | Age: 76
End: 2021-12-21
Payer: MEDICARE

## 2021-12-21 ENCOUNTER — TELEPHONE (OUTPATIENT)
Dept: NEUROLOGY | Facility: CLINIC | Age: 76
End: 2021-12-21

## 2021-12-21 VITALS
HEIGHT: 62 IN | SYSTOLIC BLOOD PRESSURE: 148 MMHG | WEIGHT: 147 LBS | DIASTOLIC BLOOD PRESSURE: 82 MMHG | BODY MASS INDEX: 27.05 KG/M2

## 2021-12-21 DIAGNOSIS — E78.5 HYPERLIPIDEMIA, UNSPECIFIED HYPERLIPIDEMIA TYPE: ICD-10-CM

## 2021-12-21 DIAGNOSIS — N18.30 STAGE 3 CHRONIC KIDNEY DISEASE, UNSPECIFIED WHETHER STAGE 3A OR 3B CKD (HCC): ICD-10-CM

## 2021-12-21 DIAGNOSIS — I10 HYPERTENSION, ESSENTIAL: ICD-10-CM

## 2021-12-21 DIAGNOSIS — I63.10 CEREBRAL INFARCTION DUE TO EMBOLISM OF PRECEREBRAL ARTERY (HCC): Primary | ICD-10-CM

## 2021-12-21 PROCEDURE — 99495 TRANSJ CARE MGMT MOD F2F 14D: CPT | Performed by: FAMILY MEDICINE

## 2021-12-27 ENCOUNTER — PATIENT OUTREACH (OUTPATIENT)
Dept: FAMILY MEDICINE CLINIC | Facility: CLINIC | Age: 76
End: 2021-12-27

## 2021-12-28 ENCOUNTER — HOSPITAL ENCOUNTER (OUTPATIENT)
Dept: RADIOLOGY | Age: 76
Discharge: HOME | End: 2021-12-28
Payer: MEDICARE

## 2021-12-28 DIAGNOSIS — I63.9 STROKE (HCC): ICD-10-CM

## 2021-12-28 PROCEDURE — G1004 CDSM NDSC: HCPCS

## 2021-12-28 PROCEDURE — 70496 CT ANGIOGRAPHY HEAD: CPT

## 2021-12-28 PROCEDURE — 70498 CT ANGIOGRAPHY NECK: CPT

## 2021-12-28 RX ADMIN — IOHEXOL 85 ML: 350 INJECTION, SOLUTION INTRAVENOUS at 10:07

## 2021-12-31 ENCOUNTER — TELEPHONE (OUTPATIENT)
Dept: OTHER | Facility: HOSPITAL | Age: 76
End: 2021-12-31

## 2022-01-03 ENCOUNTER — TELEPHONE (OUTPATIENT)
Dept: NEUROLOGY | Facility: CLINIC | Age: 77
End: 2022-01-03

## 2022-01-05 ENCOUNTER — PATIENT OUTREACH (OUTPATIENT)
Dept: FAMILY MEDICINE CLINIC | Facility: CLINIC | Age: 77
End: 2022-01-05

## 2022-01-05 ENCOUNTER — TELEPHONE (OUTPATIENT)
Dept: NEUROLOGY | Facility: CLINIC | Age: 77
End: 2022-01-05

## 2022-01-05 NOTE — TELEPHONE ENCOUNTER
21-30 Day Post CVA Discharge Follow Up    Hospitalization: 12/11/2021 - 12/14/2021  The purpose of this phone call is to assess patient's general wellbeing or for any assistance needed with follow-up care  Called, reached patient, I introduced myself and explained neurovascular nurse navigator role  Since discharge, she denies experiencing any new or worsening stroke-like symptoms  Patient continues to have "foggy" vision and feels as though she is walking through a haze  Notes it is improving  Ambulation / ADLs:  she is ambulating independently as well as preforming her own ADLs  Patient manages her own medications, appointments, and affairs  Appointments / Medication Review:  Reviewed appointments - patient successfully followed up with PCP  Patient scheduled for stroke hospital follow up appointment 1/10  Also scheduled with ophthalmology in February  I reviewed medications with her  There have not been any medication changes since discharge from the hospital  Reports having no difficulties obtaining medications  Reports she is taking all as prescribed with no missed doses, medication side effects, or signs of bleeding  Risk Factors / Education:  She verbalizes stroke type, symptoms, personal risk factors and management, medications, and resources  BP is monitored at home  Reported average BP continues to be 120s/70s  I addressed all her questions   At the conclusion of the conversation, patient denies having any further questions or concerns

## 2022-01-05 NOTE — PROGRESS NOTES
Outpatient Care Management Note:    Care manager called Cierra Black  She states that she is doing well  She is back on her eliquis per neurology  She feels her vision is improving slightly but admits to ongoing blurry vision  She denies any issues with managing her medications  We again reviewed warning signs of stroke  Cierra Black knows to call [phone] with any of these symptoms  Cierra Black declined ongoing outreach  She will call Dr Norris's office directly for any questions

## 2022-01-07 ENCOUNTER — CONSULT (OUTPATIENT)
Dept: CARDIOLOGY CLINIC | Facility: CLINIC | Age: 77
End: 2022-01-07
Payer: MEDICARE

## 2022-01-07 VITALS
HEIGHT: 62 IN | WEIGHT: 148.4 LBS | BODY MASS INDEX: 27.31 KG/M2 | SYSTOLIC BLOOD PRESSURE: 158 MMHG | HEART RATE: 77 BPM | DIASTOLIC BLOOD PRESSURE: 74 MMHG

## 2022-01-07 DIAGNOSIS — I63.9 STROKE (HCC): ICD-10-CM

## 2022-01-07 DIAGNOSIS — E78.5 DYSLIPIDEMIA: ICD-10-CM

## 2022-01-07 DIAGNOSIS — I63.9 CEREBROVASCULAR ACCIDENT (CVA), UNSPECIFIED MECHANISM (HCC): Primary | ICD-10-CM

## 2022-01-07 DIAGNOSIS — I10 BENIGN ESSENTIAL HTN: ICD-10-CM

## 2022-01-07 PROCEDURE — 99204 OFFICE O/P NEW MOD 45 MIN: CPT | Performed by: INTERNAL MEDICINE

## 2022-01-07 RX ORDER — ASPIRIN 81 MG/1
81 TABLET ORAL DAILY
Qty: 90 TABLET | Refills: 1 | Status: SHIPPED | OUTPATIENT
Start: 2022-01-07

## 2022-01-07 NOTE — PROGRESS NOTES
Cardiology             Yael Proctor  1945  [id number]          Reason for consultation:  CVA, recommendation for loop recorder placement      Assessment/Plan:    1  CVA:  Thought to be embolic in nature based on MRI brain  Patient presents today for evaluation of loop recorder implantation  She mainly tells me that she does not want to loop recorder, as she "does not do well with foreign objects in her body "  She has been compliant with Eliquis and atorvastatin, and was encouraged the same  BRENNAN was unremarkable with the exception of 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 arch/proximal descending aorta  She will continue following up with Neurology and her PCP from here on   2  Hypertension:  Blood pressure elevated on today's visit  Recommend home blood pressure monitoring  If persistently elevated future prior visit, recommend initiation of 2nd antihypertensive  Follow-up with PCP  3  Dyslipidemia:  Possible heterozygous hypercholesterolemia based on severity of LDL cholesterol  Encouraged compliance with the same  Patient states she has been taking atorvastatin since discharge from hospital   Follow-up lipid panel recommended in 3 months  Follow-up with PCP for the same  She will follow-up with me as needed  Interval History: This is a very pleasant 81-year-old female hospitalized 12/11/2021 secondary to blurry vision, MRI brain, diagnosed with CVA, suspicious for embolic pathology based on MRI  Francois Ripper was unremarkable with the exception of a moderate size protruding sessile 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 arch and proximal descending thoracic aorta  She was discharged on Eliquis  Her LDL cholesterol was markedly elevated greater than 200 mg/dL and she was placed on atorvastatin  She presents today for initial consultation for cardiology follow-up and for loop recorder placement recommendations as recommended by Neurology  She immediately tells me that she does not want to loop recorder    She has been compliant with atorvastatin  She denies chest pain  She has chronic but stable exertional dyspnea with no change as of late  She denies lower extremity edema, orthopnea, palpitations, dizziness  Vitals:  Vitals:    01/07/22 0946   BP: 158/74   BP Location: Right arm   Patient Position: Sitting   Cuff Size: Large   Pulse: 77   Weight: 67 3 kg (148 lb 6 4 oz)   Height: 5' 2" (1 575 m)         Past Medical History:   Diagnosis Date    Hypertension      Social History     Socioeconomic History    Marital status: Single     Spouse name: Not on file    Number of children: Not on file    Years of education: Not on file    Highest education level: Not on file   Occupational History    Not on file   Tobacco Use    Smoking status: Current Every Day Smoker     Packs/day: 1 00    Smokeless tobacco: Never Used   Vaping Use    Vaping Use: Never used   Substance and Sexual Activity    Alcohol use: Yes     Comment: rare    Drug use: No    Sexual activity: Not on file   Other Topics Concern    Not on file   Social History Narrative    Not on file     Social Determinants of Health     Financial Resource Strain: Not on file   Food Insecurity: Not on file   Transportation Needs: Not on file   Physical Activity: Not on file   Stress: Not on file   Social Connections: Not on file   Intimate Partner Violence: Not on file   Housing Stability: Not on file      Family History   Problem Relation Age of Onset    Heart disease Mother     Heart disease Father     Crohn's disease Son     Mental illness Son     No Known Problems Daughter     No Known Problems Daughter     No Known Problems Son     No Known Problems Son     No Known Problems Son      No past surgical history on file      Current Outpatient Medications:     amLODIPine (NORVASC) 10 mg tablet, Take 1 tablet (10 mg total) by mouth daily, Disp: 90 tablet, Rfl: 1    apixaban (Eliquis) 5 mg, Take 1 tablet (5 mg total) by mouth 2 (two) times a day, Disp: 60 tablet, Rfl: 0    aspirin (ECOTRIN LOW STRENGTH) 81 mg EC tablet, Take 1 tablet (81 mg total) by mouth daily, Disp: 40 tablet, Rfl: 0    atorvastatin (LIPITOR) 80 mg tablet, Take 1 tablet (80 mg total) by mouth every evening, Disp: 40 tablet, Rfl: 0    meclizine (ANTIVERT) 25 mg tablet, Take 1 tablet (25 mg total) by mouth every 8 (eight) hours as needed for dizziness (Patient not taking: Reported on 1/7/2022 ), Disp: 15 tablet, Rfl: 0    nicotine (NICODERM CQ) 21 mg/24 hr TD 24 hr patch, Place 1 patch on the skin daily (Patient not taking: Reported on 12/20/2021 ), Disp: 28 patch, Rfl: 0    rivaroxaban (Xarelto) 20 mg tablet, Take 1 tablet (20 mg total) by mouth daily with breakfast (Patient not taking: Reported on 12/20/2021 ), Disp: 40 tablet, Rfl: 0        Review of Systems:  Review of Systems   Constitutional: Negative for activity change, fever and unexpected weight change  HENT: Negative for facial swelling, nosebleeds and voice change  Respiratory: Negative for chest tightness, shortness of breath and wheezing  Cardiovascular: Negative for chest pain, palpitations and leg swelling  Gastrointestinal: Negative for abdominal distention  Genitourinary: Negative for hematuria  Musculoskeletal: Negative for arthralgias  Skin: Negative for color change, pallor, rash and wound  Neurological: Negative for dizziness, seizures and syncope  Psychiatric/Behavioral: Negative for agitation  Physical Exam:  Physical Exam  Vitals reviewed  Constitutional:       Appearance: She is well-developed  HENT:      Head: Normocephalic and atraumatic  Cardiovascular:      Rate and Rhythm: Normal rate and regular rhythm  Heart sounds: Normal heart sounds  Pulmonary:      Effort: Pulmonary effort is normal       Breath sounds: Normal breath sounds  Abdominal:      Palpations: Abdomen is soft  Musculoskeletal:         General: Normal range of motion        Cervical back: Normal range of motion and neck supple  Skin:     General: Skin is warm and dry  Neurological:      Mental Status: She is alert and oriented to person, place, and time  Psychiatric:         Behavior: Behavior normal          Thought Content: Thought content normal          Judgment: Judgment normal          This note was completed in part utilizing M-Modal Fluency Direct Software  Grammatical errors, random word insertions, spelling mistakes, and incomplete sentences can be an occasional consequence of this system secondary to software limitations, ambient noise, and hardware issues  If you have any questions or concerns about the content, text, or information contained within the body of this dictation, please contact the provider for clarification

## 2022-01-07 NOTE — LETTER
January 7, 2022     Avel De Santiago MD  [address] [address]    Patient: Xiang Goldsmith   YOB: 1945   Date of Visit: 1/7/2022       Dear Dr Vince Mcclain:    Thank you for referring Xiang Goldsmith to me for evaluation  Below are my notes for this consultation  If you have questions, please do not hesitate to call me  I look forward to following your patient along with you  Sincerely,        Rick Valentin DO        CC: No Recipients  Rick Valentin DO  1/7/2022 10:18 AM  Sign when Signing Visit        Cardiology             Xiang Goldsmith  1945  [id number]          Reason for consultation:  CVA, recommendation for loop recorder placement      Assessment/Plan:    1  CVA:  Thought to be embolic in nature based on MRI brain  Patient presents today for evaluation of loop recorder implantation  She mainly tells me that she does not want to loop recorder, as she "does not do well with foreign objects in her body "  She has been compliant with Eliquis and atorvastatin, and was encouraged the same  BRENNAN was unremarkable with the exception of 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 arch/proximal descending aorta  She will continue following up with Neurology and her PCP from here on   2  Hypertension:  Blood pressure elevated on today's visit  Recommend home blood pressure monitoring  If persistently elevated future prior visit, recommend initiation of 2nd antihypertensive  Follow-up with PCP  3  Dyslipidemia:  Possible heterozygous hypercholesterolemia based on severity of LDL cholesterol  Encouraged compliance with the same  Patient states she has been taking atorvastatin since discharge from hospital   Follow-up lipid panel recommended in 3 months  Follow-up with PCP for the same  She will follow-up with me as needed  Interval History:      This is a very pleasant 53-year-old female hospitalized 12/11/2021 secondary to blurry vision, MRI brain, diagnosed with CVA, suspicious for embolic pathology based on MRI  China Fernandez was unremarkable with the exception of a moderate size protruding sessile 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 arch and proximal descending thoracic aorta  She was discharged on Eliquis  Her LDL cholesterol was markedly elevated greater than 200 mg/dL and she was placed on atorvastatin  She presents today for initial consultation for cardiology follow-up and for loop recorder placement recommendations as recommended by Neurology  She immediately tells me that she does not want to loop recorder  She has been compliant with atorvastatin  She denies chest pain  She has chronic but stable exertional dyspnea with no change as of late  She denies lower extremity edema, orthopnea, palpitations, dizziness               Vitals:  Vitals:    01/07/22 0946   BP: 158/74   BP Location: Right arm   Patient Position: Sitting   Cuff Size: Large   Pulse: 77   Weight: 67 3 kg (148 lb 6 4 oz)   Height: 5' 2" (1 575 m)         Past Medical History:   Diagnosis Date    Hypertension      Social History     Socioeconomic History    Marital status: Single     Spouse name: Not on file    Number of children: Not on file    Years of education: Not on file    Highest education level: Not on file   Occupational History    Not on file   Tobacco Use    Smoking status: Current Every Day Smoker     Packs/day: 1 00    Smokeless tobacco: Never Used   Vaping Use    Vaping Use: Never used   Substance and Sexual Activity    Alcohol use: Yes     Comment: rare    Drug use: No    Sexual activity: Not on file   Other Topics Concern    Not on file   Social History Narrative    Not on file     Social Determinants of Health     Financial Resource Strain: Not on file   Food Insecurity: Not on file   Transportation Needs: Not on file   Physical Activity: Not on file   Stress: Not on file   Social Connections: Not on file   Intimate Partner Violence: Not on file   Housing Stability: Not on file      Family History Problem Relation Age of Onset    Heart disease Mother     Heart disease Father     Crohn's disease Son     Mental illness Son     No Known Problems Daughter     No Known Problems Daughter     No Known Problems Son     No Known Problems Son     No Known Problems Son      No past surgical history on file  Current Outpatient Medications:     amLODIPine (NORVASC) 10 mg tablet, Take 1 tablet (10 mg total) by mouth daily, Disp: 90 tablet, Rfl: 1    apixaban (Eliquis) 5 mg, Take 1 tablet (5 mg total) by mouth 2 (two) times a day, Disp: 60 tablet, Rfl: 0    aspirin (ECOTRIN LOW STRENGTH) 81 mg EC tablet, Take 1 tablet (81 mg total) by mouth daily, Disp: 40 tablet, Rfl: 0    atorvastatin (LIPITOR) 80 mg tablet, Take 1 tablet (80 mg total) by mouth every evening, Disp: 40 tablet, Rfl: 0    meclizine (ANTIVERT) 25 mg tablet, Take 1 tablet (25 mg total) by mouth every 8 (eight) hours as needed for dizziness (Patient not taking: Reported on 1/7/2022 ), Disp: 15 tablet, Rfl: 0    nicotine (NICODERM CQ) 21 mg/24 hr TD 24 hr patch, Place 1 patch on the skin daily (Patient not taking: Reported on 12/20/2021 ), Disp: 28 patch, Rfl: 0    rivaroxaban (Xarelto) 20 mg tablet, Take 1 tablet (20 mg total) by mouth daily with breakfast (Patient not taking: Reported on 12/20/2021 ), Disp: 40 tablet, Rfl: 0        Review of Systems:  Review of Systems   Constitutional: Negative for activity change, fever and unexpected weight change  HENT: Negative for facial swelling, nosebleeds and voice change  Respiratory: Negative for chest tightness, shortness of breath and wheezing  Cardiovascular: Negative for chest pain, palpitations and leg swelling  Gastrointestinal: Negative for abdominal distention  Genitourinary: Negative for hematuria  Musculoskeletal: Negative for arthralgias  Skin: Negative for color change, pallor, rash and wound  Neurological: Negative for dizziness, seizures and syncope  Psychiatric/Behavioral: Negative for agitation  Physical Exam:  Physical Exam  Vitals reviewed  Constitutional:       Appearance: She is well-developed  HENT:      Head: Normocephalic and atraumatic  Cardiovascular:      Rate and Rhythm: Normal rate and regular rhythm  Heart sounds: Normal heart sounds  Pulmonary:      Effort: Pulmonary effort is normal       Breath sounds: Normal breath sounds  Abdominal:      Palpations: Abdomen is soft  Musculoskeletal:         General: Normal range of motion  Cervical back: Normal range of motion and neck supple  Skin:     General: Skin is warm and dry  Neurological:      Mental Status: She is alert and oriented to person, place, and time  Psychiatric:         Behavior: Behavior normal          Thought Content: Thought content normal          Judgment: Judgment normal          This note was completed in part utilizing M-Modal Fluency Direct Software  Grammatical errors, random word insertions, spelling mistakes, and incomplete sentences can be an occasional consequence of this system secondary to software limitations, ambient noise, and hardware issues  If you have any questions or concerns about the content, text, or information contained within the body of this dictation, please contact the provider for clarification

## 2022-01-10 ENCOUNTER — OFFICE VISIT (OUTPATIENT)
Dept: NEUROLOGY | Facility: CLINIC | Age: 77
End: 2022-01-10
Payer: MEDICARE

## 2022-01-10 VITALS
HEIGHT: 62 IN | HEART RATE: 75 BPM | SYSTOLIC BLOOD PRESSURE: 146 MMHG | BODY MASS INDEX: 27.42 KG/M2 | DIASTOLIC BLOOD PRESSURE: 79 MMHG | WEIGHT: 149 LBS

## 2022-01-10 DIAGNOSIS — E78.5 HYPERLIPIDEMIA, UNSPECIFIED HYPERLIPIDEMIA TYPE: ICD-10-CM

## 2022-01-10 DIAGNOSIS — H53.9 VISUAL DISTURBANCE: ICD-10-CM

## 2022-01-10 DIAGNOSIS — R73.03 PREDIABETES: ICD-10-CM

## 2022-01-10 DIAGNOSIS — I63.10 CEREBRAL INFARCTION DUE TO EMBOLISM OF PRECEREBRAL ARTERY (HCC): Primary | ICD-10-CM

## 2022-01-10 DIAGNOSIS — I10 HYPERTENSION, ESSENTIAL: ICD-10-CM

## 2022-01-10 PROCEDURE — 99215 OFFICE O/P EST HI 40 MIN: CPT | Performed by: PHYSICIAN ASSISTANT

## 2022-01-10 NOTE — PROGRESS NOTES
Patient ID: Xiang Goldsmith is a 78 y o  female  Assessment/Plan:    No problem-specific Assessment & Plan notes found for this encounter  {Assess/PlanSmartLinks:26062}       Subjective:    HPI    {[facility] HPI texts:31108}    {Common ambulatory SmartLinks:16812}         Objective:    Blood pressure 146/79, pulse 75, height 5' 2" (1 575 m), weight 67 6 kg (149 lb), not currently breastfeeding  Physical Exam    Neurological Exam      ROS:    Review of Systems   Constitutional: Negative  Negative for appetite change and fever  HENT: Negative  Negative for hearing loss, tinnitus, trouble swallowing and voice change  Eyes: Negative  Negative for photophobia and pain  Respiratory: Negative  Negative for shortness of breath  Cardiovascular: Negative  Negative for palpitations  Gastrointestinal: Negative  Negative for nausea and vomiting  Endocrine: Negative  Negative for cold intolerance  Genitourinary: Negative  Negative for dysuria, frequency and urgency  Musculoskeletal: Negative  Negative for myalgias and neck pain  Skin: Negative  Negative for rash  Neurological: Negative  Negative for dizziness, tremors, seizures, syncope, facial asymmetry, speech difficulty, weakness, light-headedness, numbness and headaches  Hematological: Negative  Does not bruise/bleed easily  Psychiatric/Behavioral: Negative  Negative for confusion, hallucinations and sleep disturbance

## 2022-01-10 NOTE — PATIENT INSTRUCTIONS
 Continue with combination of aspirin 81mg, Eliquis 5mg twice a day and Lipitor 80mg daily for secondary stroke prevention   BP goal < 130/80, BP is at goal     LDL goal < 70   Defer to PCP regarding glycemic and blood pressure management    Counseling    Smoking cessation   I advised patient to avoid using NSAIDs for headaches or other pain and to stick to tylenol if needed   Recommend mediterranean diet & regular exercise regimen atleast 4-5 times a week for 20-30 minutes  Mirtha Frost Educated patient/family regarding medication compliance  Recommend follow up 6 months  I would be happy to see the patient sooner if any new questions/concerns arise  Patient/Guardian was advised to the call the office if they have any questions and concerns in the meantime  Patient/Guardian does understand that if they have any new stroke like symptoms such as facial droop on one side, weakness/paralysis on either side, speech trouble, numbness on one side, balance issues, any vision changes, extreme dizziness or any new headache, to call 9-1-1 immediately or to proceed to the nearest ER immediately

## 2022-01-10 NOTE — ASSESSMENT & PLAN NOTE
Cholesterol panel:   Cholesterol 315, trig 200, HDL 40,     Atorvastatin 80mg   Not following any particular diet

## 2022-01-10 NOTE — PROGRESS NOTES
[address] [facility]  PATIENT:  Anish Frederick  MRN:  [id number]  :  1945  DATE OF SERVICE:  1/10/2022      Assessment/Plan:        Problem List Items Addressed This Visit        Cardiovascular and Mediastinum    Hypertension, essential     Does monitor BP at home, varying throughout the day  She states she averages 120-130's/70's  Goal <795/98         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([facility] [address] ) - Primary  Continue with combination of aspirin 81mg daily, Eliquis 5mg twice a day and atorvastatin 80mg daily for secondary stroke prevention  Discussed ILR but she does not wish for implantation  Will reach out to cardiology in regards to holter monitor   BP goal < 130/80, BP is at goal    LDL goal < 70  Defer to PCP regarding glycemic and blood pressure management    Counseling   Smoking cessation   I advised patient to avoid using NSAIDs for headaches or other pain and to stick to tylenol if needed  Recommend mediterranean diet & regular exercise regimen atleast 4-5 times a week for 20-30 minutes  Educated patient/family regarding medication compliance    Recommend follow up 6 months  I would be happy to see the patient sooner if any new questions/concerns arise  Patient/Guardian was advised to the call the office if they have any questions and concerns in the meantime  Patient/Guardian does understand that if they have any new stroke like symptoms such as facial droop on one side, weakness/paralysis on either side, speech trouble, numbness on one side, balance issues, any vision changes, extreme dizziness or any new headache, to call  immediately or to proceed to the nearest ER immediately            Other    Hyperlipidemia     Atorvastatin 80mg   Not following any particular diet           Prediabetes    Visual disturbance  Follow up with ophthalmologist              History of Present Illness:   Anish Frederick is a 78 y o  female with past medical history including uncontrolled hypertension, hyperlipidemia, tobacco use who presents for follow up in regard to her recent stroke  Carrie Juan initially presented to [facility] on 12/07/2021 complaints of blurred vision and dizziness for the past 2 days  On exam there was no visual field cuts  Blood pressure documented 211/104  A CT head was completed with no acute intracranial abnormality  The following day she was seen outpatient by Ophthalmology who recommended a neurological consult  She presented to [facility] on 12/11/2021 after an outside MRI was completed with pertinent findings  MRI brain without contrast showed recent infarcts and locations in bilateral cerebellar hemispheres, left thalamus, right occipital lobe and right parieto-occipital junction compatible with embolic phenomenon  Chronic left occipital lobe infarct noted  CTA head and neck with a anu calcification carotid bifurcation bilaterally results in mild to moderate right and mid left carotid stenosis  Left vertebral artery occluded at the C1 level reconstitutes intracranially at the PICA level the retrograde pathway  Atherosclerotic calcification cavernous in some colonic ICA with mild intrarenal stenosis bilaterally  Findings is intracranial atherosclerosis is mild narrowing and irregularity distal right M1 segment  BRENNAN with EF of 60%, no thrombus, no PFO  There is a moderate-sized protruding sessile atheromas in the distal arch and proximal descending thoracic aorta  Etiology felt to be high suspicion for cardioembolic although athero-embolic cannot be completely ruled out with bilateral ICA atherosclerosis  She was started on aspirin 81mg daily with plan to repeat CTA head and neck 2 weeks after then started Eliquis twice a day  It was recommended to follow up cardiology outpatient for possible loop recorder for which she did not wish to have it implanted  She states in the past she had foreign body implanted and they manage to get rejected from her body  Residual symptoms of blurred vision  She has not lately had any dizziness  She did not get any physical or occupational therapy  She denies any falls at home  She does live at home by herself  She can do her own ADLs  She has an upcoming appointment 01/31/2022  Shweta Bennett has been on aspirin 81mg, Eliquis 5mg twice a day and the atorvastatin 80mg for secondary stroke prevention  She is compliant and tolerating the medication without significant side effects  No reported new TIA/stroke like symptoms  No problems falling or staying asleep  She averages 8-9 hours a night  No history of sleep apnea  No one has ever complained of her snoring  She feels well rested when she wakes up  She does states some days she is tired all day  Since the stroke she denies any anxiety or depression  She is still smoking at this time but states she cuts  Smoking for 65 years  Results:     Stroke risk factors were evaluated including:   Lab Results   Component Value Date/Time    CHOLESTEROL 315 (H) 12/11/2021 08:50 AM    CHOLESTEROL 282 (H) 08/06/2020 09:22 AM     Lab Results   Component Value Date/Time    TRIG 200 (H) 12/11/2021 08:50 AM    TRIG 233 (H) 08/06/2020 09:22 AM     Lab Results   Component Value Date/Time    HDL 40 (L) 12/11/2021 08:50 AM    HDL 44 (L) 08/06/2020 09:22 AM     Lab Results   Component Value Date/Time    LDLCALC 235 (H) 12/11/2021 08:50 AM    LDLCALC 195 (H) 08/06/2020 09:22 AM       Lab Results   Component Value Date/Time    HGBA1C 5 9 (H) 12/11/2021 08:50 AM    HGBA1C 5 8 (H) 08/06/2020 09:22 AM     Lab Results   Component Value Date/Time     12/11/2021 08:50 AM     Thrombosis panel 12/13/21:  Mildly elevated protein S activity - 137    Imaging:     No results found for this or any previous visit  No results found for this or any previous visit      Results for orders placed during the hospital encounter of 12/28/21    CTA head and neck w wo contrast    Narrative  CTA NECK AND BRAIN WITH AND WITHOUT CONTRAST    INDICATION: I63 9: Cerebral infarction, unspecified    COMPARISON:   MRI and CTA dated 12/11/2021  TECHNIQUE:  Routine CT imaging of the Brain without contrast   Post contrast imaging was performed after administration of iodinated contrast through the neck and brain  Post contrast axial 0 625 mm images timed to opacify the arterial system  3D rendering was performed on an independent workstation  MIP reconstructions performed  Coronal reconstructions were performed of the noncontrast portion of the brain  Radiation dose length product (DLP) for this visit:  1164 mGy-cm   This examination, like all CT scans performed in the [facility], was performed utilizing techniques to minimize radiation dose exposure, including the use of iterative  reconstruction and automated exposure control  IV Contrast:  85 mL of iohexol (OMNIPAQUE)    IMAGE QUALITY:   Diagnostic    FINDINGS:  NONCONTRAST BRAIN  PARENCHYMA:  No intracranial mass, mass effect or midline shift  No CT signs of acute infarction  No acute parenchymal hemorrhage  Stable encephalomalacia in the left occipital lobe  Interval evolution of previously seen right parieto-occipital  infarct  Left thalamic lacunar infarct and small cerebellar infarcts are better appreciated on previous MRI  Stable diminished attenuation in the periventricular and subcortical white matter due to chronic microangiopathy  VENTRICLES AND EXTRA-AXIAL SPACES:  Normal for the patient's age  VISUALIZED ORBITS AND PARANASAL SINUSES:  Unremarkable  CERVICAL VASCULATURE  AORTIC ARCH AND GREAT VESSELS:  Mild atherosclerotic disease of the arch, proximal great vessels and visualized subclavian vessels  No significant stenosis  RIGHT VERTEBRAL ARTERY CERVICAL SEGMENT:  Mild stenosis at the origin  The vessel is normal in caliber throughout the neck  LEFT VERTEBRAL ARTERY CERVICAL SEGMENT: Nondominant  Normal origin  Stable occlusion of the V3 segment at the level of C1 with intracranial reconstitution  RIGHT EXTRACRANIAL CAROTID SEGMENT:  Moderate atherosclerotic disease of the bifurcation with approximately 60% stenosis in the proximal right internal carotid artery  LEFT EXTRACRANIAL CAROTID SEGMENT:  Mild atherosclerotic disease of the distal common carotid artery and proximal cervical internal carotid artery without significant stenosis compared to the more distal ICA  NASCET criteria was used to determine the degree of internal carotid artery diameter stenosis  INTRACRANIAL VASCULATURE  INTERNAL CAROTID ARTERIES: Atherosclerotic disease of the cavernous and supraclinoid internal carotid arteries with mild stenosis  There is moderate stenosis in the terminal segment of the right MCA  ANTERIOR CIRCULATION: Unremarkable anterior cerebral arteries  Left A1 segment is dominant  MIDDLE CEREBRAL ARTERY CIRCULATION: Stable mild narrowing of distal right M1 and proximal to segments  No significant stenosis of the left M1 segment  Mild and moderate left M2 stenosis is unchanged  No large vessel occlusion  DISTAL VERTEBRAL ARTERIES:  Moderate stenosis in the proximal V4 segment of the dominant right vertebral artery  Stable proximal occlusion of the left vertebral artery with reconstitution of the distal V4 segment and left posterior inferior cerebellar  artery via retrograde flow  Right posterior inferior cerebellar artery origin is normal     BASILAR ARTERY:  Basilar artery is normal in caliber  Normal superior cerebellar arteries  POSTERIOR CEREBRAL ARTERIES: Right posterior cerebral artery arises from the basilar tip  No significant stenosis of the right P1 or P2 segments  Hypoplastic and/or disease left P1 segment  Patent left posterior communicating artery  Stable diffusely  attenuated left P2 segment      VENOUS STRUCTURES:  Normal       NON VASCULAR ANATOMY  BONY STRUCTURES:  Spinal degenerative changes  Degenerative grade 1 anterolisthesis at C3-4  SOFT TISSUES OF THE NECK:  Normal     THORACIC INLET:  Small calcified granuloma left upper lobe  Stable 3 mm subpleural nodule left upper lobe on image 446 of series 6 that may represent a benign intrapulmonary lymph node  Impression  Interval evolution of right PCA infarct  No acute infarct, hemorrhage or mass  Stable chronic left PCA infarct  Chronic microangiopathy  Stable moderate stenosis proximal right internal carotid artery  Stable distal occlusion of the left vertebral artery V3 segment with intracranial reconstitution via retrograde flow at the level of the posterior communicating artery  Stable multifocal mild and moderate intracranial stenosis as well as diffusely attenuated left posterior cerebral artery  Workstation performed: [id number]    No results found for this or any previous visit  No results found for this or any previous visit  No results found for this or any previous visit        Past Medical History:     Past Medical History:   Diagnosis Date    Hypertension        Patient Active Problem List   Diagnosis    Hyperlipidemia    Hypertension, essential    Prediabetes    Hip pain, chronic, right    SOB (shortness of breath)    Chronic pain of right knee    Stage 3 chronic kidney disease, unspecified whether stage 3a or 3b CKD (HCC)    Vertigo    Visual disturbance    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(HCC)       Medications:      Current Outpatient Medications   Medication Sig Dispense Refill    amLODIPine (NORVASC) 10 mg tablet Take 1 tablet (10 mg total) by mouth daily 90 tablet 1    apixaban (Eliquis) 5 mg Take 1 tablet (5 mg total) by mouth 2 (two) times a day 60 tablet 0    aspirin (ECOTRIN LOW STRENGTH) 81 mg EC tablet Take 1 tablet (81 mg total) by mouth daily 90 tablet 1    atorvastatin (LIPITOR) 80 mg tablet Take 1 tablet (80 mg total) by mouth every evening 40 tablet 0    meclizine (ANTIVERT) 25 mg tablet Take 1 tablet (25 mg total) by mouth every 8 (eight) hours as needed for dizziness (Patient not taking: Reported on 1/7/2022 ) 15 tablet 0    nicotine (NICODERM CQ) 21 mg/24 hr TD 24 hr patch Place 1 patch on the skin daily (Patient not taking: Reported on 12/20/2021 ) 28 patch 0     No current facility-administered medications for this visit  Allergies:    No Known Allergies    Family History:     Family History   Problem Relation Age of Onset    Heart disease Mother     Heart disease Father     Crohn's disease Son     Mental illness Son     No Known Problems Daughter     No Known Problems Daughter     No Known Problems Son     No Known Problems Son     No Known Problems Son        Social History:     Social History     Socioeconomic History    Marital status: Single     Spouse name: Not on file    Number of children: Not on file    Years of education: Not on file    Highest education level: Not on file   Occupational History    Not on file   Tobacco Use    Smoking status: Current Every Day Smoker     Packs/day: 1 00    Smokeless tobacco: Never Used   Vaping Use    Vaping Use: Never used   Substance and Sexual Activity    Alcohol use: Yes     Comment: rare    Drug use: No    Sexual activity: Not on file   Other Topics Concern    Not on file   Social History Narrative    Not on file     Social Determinants of Health     Financial Resource Strain: Not on file   Food Insecurity: Not on file   Transportation Needs: Not on file   Physical Activity: Not on file   Stress: Not on file   Social Connections: Not on file   Intimate Partner Violence: Not on file   Housing Stability: Not on file         ROS:     Review of Systems   Constitutional: Negative  Negative for appetite change and fever  HENT: Negative  Negative for hearing loss, tinnitus, trouble swallowing and voice change  Eyes: Negative  Negative for photophobia and pain  Respiratory: Negative    Negative for shortness of breath  Cardiovascular: Negative  Negative for palpitations  Gastrointestinal: Negative  Negative for nausea and vomiting  Endocrine: Negative  Negative for cold intolerance  Genitourinary: Negative  Negative for dysuria, frequency and urgency  Musculoskeletal: Negative  Negative for myalgias and neck pain  Skin: Negative  Negative for rash  Neurological: Negative  Negative for dizziness, tremors, seizures, syncope, facial asymmetry, speech difficulty, weakness, light-headedness, numbness and headaches  Hematological: Negative  Does not bruise/bleed easily  Psychiatric/Behavioral: Negative  Negative for confusion, hallucinations and sleep disturbance  ROS reviewed personally and edited as needed  Objective:   Physical Exam:    /79 (BP Location: Right arm, Patient Position: Sitting, Cuff Size: Standard)   Pulse 75   Ht 5' 2" (1 575 m)   Wt 67 6 kg (149 lb)   BMI 27 25 kg/m²     Physical Exam  Eyes:      Extraocular Movements: EOM normal       Pupils: Pupils are equal, round, and reactive to light  Neurological:      Mental Status: She is oriented to person, place, and time  Coordination: Finger-Nose-Finger Test and Romberg Test normal       Gait: Gait is intact  Deep Tendon Reflexes: Strength normal    Psychiatric:         Speech: Speech normal        Neurologic Exam     Mental Status   Oriented to person, place, and time  Registration: recalls 3 of 3 objects  Follows 2 step commands  Attention: normal  Concentration: normal    Speech: speech is normal   Level of consciousness: alert  Knowledge: good  Normal comprehension  Cranial Nerves     CN II   Visual fields full to confrontation  Right visual field deficit: none  Left visual field deficit: none     CN III, IV, VI   Pupils are equal, round, and reactive to light  Extraocular motions are normal    Right pupil: Size: 3 mm  Shape: regular  Reactivity: brisk  Left pupil: Size: 3 mm   Shape: regular  Reactivity: brisk  CN III: no CN III palsy  CN VI: no CN VI palsy  Nystagmus: none   Diplopia: none  Ophthalmoparesis: none  Upgaze: normal  Downgaze: normal  Conjugate gaze: present    CN V   Facial sensation intact  CN VIII   Hearing: intact    CN XI   Right trapezius strength: normal  Left trapezius strength: normal    Motor Exam   Muscle bulk: normal  Right arm pronator drift: absent  Left arm pronator drift: absent    Strength   Strength 5/5 throughout  Sensory Exam   Light touch normal      Gait, Coordination, and Reflexes     Gait  Gait: normal    Coordination   Romberg: negative  Finger to nose coordination: normal    Reflexes   Right ankle clonus: absent  Left ankle clonus: absent        I have spent 55 minutes with Patient  today in which greater than 50% of this time was spent in counseling/coordination of care regarding Diagnostic results, Prognosis, Risks and benefits of tx options, Intructions for management, Patient and family education, Importance of tx compliance, Risk factor reductions, Impressions and Plan of care as above

## 2022-01-10 NOTE — ASSESSMENT & PLAN NOTE
Does monitor BP at home, varying throughout the day  She states she averages 120-130's/70's     Goal <130/80

## 2022-01-11 DIAGNOSIS — I63.9 STROKE (HCC): ICD-10-CM

## 2022-01-11 RX ORDER — ATORVASTATIN CALCIUM 80 MG/1
80 TABLET, FILM COATED ORAL EVERY EVENING
Qty: 90 TABLET | Refills: 1 | Status: SHIPPED | OUTPATIENT
Start: 2022-01-11 | End: 2022-06-15 | Stop reason: SDUPTHER

## 2022-01-18 ENCOUNTER — TELEPHONE (OUTPATIENT)
Dept: OTHER | Facility: OTHER | Age: 77
End: 2022-01-18

## 2022-01-18 DIAGNOSIS — I63.9 CEREBROVASCULAR ACCIDENT (CVA), UNSPECIFIED MECHANISM (HCC): Primary | ICD-10-CM

## 2022-01-18 NOTE — TELEPHONE ENCOUNTER
Patient called in stating she got a call from someone on Friday but is not sure who and the person told her they were sending out a heart monitor overnight  Patient states she was away for the weekend and checked the post office and she did not receive the heart monitor

## 2022-01-31 ENCOUNTER — NEW REFERRAL (OUTPATIENT)
Dept: URBAN - METROPOLITAN AREA CLINIC 6 | Facility: CLINIC | Age: 77
End: 2022-01-31

## 2022-01-31 DIAGNOSIS — H40.033: ICD-10-CM

## 2022-01-31 DIAGNOSIS — I69.898: ICD-10-CM

## 2022-01-31 DIAGNOSIS — H25.813: ICD-10-CM

## 2022-01-31 DIAGNOSIS — H40.053: ICD-10-CM

## 2022-01-31 PROCEDURE — 92202 OPSCPY EXTND ON/MAC DRAW: CPT

## 2022-01-31 PROCEDURE — 92132 CPTRZD OPH DX IMG ANT SGM: CPT

## 2022-01-31 PROCEDURE — 92133 CPTRZD OPH DX IMG PST SGM ON: CPT

## 2022-01-31 PROCEDURE — 92020 GONIOSCOPY: CPT

## 2022-01-31 PROCEDURE — 76514 ECHO EXAM OF EYE THICKNESS: CPT

## 2022-01-31 PROCEDURE — 92004 COMPRE OPH EXAM NEW PT 1/>: CPT

## 2022-01-31 ASSESSMENT — VISUAL ACUITY
OD_SC: 20/25+2
OU_SC: J2
OS_SC: 20/30+1

## 2022-01-31 ASSESSMENT — PACHYMETRY
OS_CT_UM: 533
OD_CT_UM: 521

## 2022-01-31 ASSESSMENT — TONOMETRY
OD_IOP_MMHG: 32
OD_IOP_MMHG: 28
OS_IOP_MMHG: 18
OS_IOP_MMHG: 26
OS_IOP_MMHG: 30
OD_IOP_MMHG: 19

## 2022-02-10 ENCOUNTER — CLINICAL SUPPORT (OUTPATIENT)
Dept: CARDIOLOGY CLINIC | Facility: CLINIC | Age: 77
End: 2022-02-10
Payer: MEDICARE

## 2022-02-10 DIAGNOSIS — I63.9 CEREBROVASCULAR ACCIDENT (CVA), UNSPECIFIED MECHANISM (HCC): ICD-10-CM

## 2022-02-10 PROCEDURE — 93248 EXT ECG>7D<15D REV&INTERPJ: CPT | Performed by: INTERNAL MEDICINE

## 2022-02-28 ENCOUNTER — IOP CHECK (OUTPATIENT)
Dept: URBAN - METROPOLITAN AREA CLINIC 6 | Facility: CLINIC | Age: 77
End: 2022-02-28

## 2022-02-28 DIAGNOSIS — H25.813: ICD-10-CM

## 2022-02-28 DIAGNOSIS — I69.898: ICD-10-CM

## 2022-02-28 DIAGNOSIS — H40.053: ICD-10-CM

## 2022-02-28 DIAGNOSIS — H40.033: ICD-10-CM

## 2022-02-28 PROCEDURE — 92012 INTRM OPH EXAM EST PATIENT: CPT

## 2022-02-28 PROCEDURE — 92083 EXTENDED VISUAL FIELD XM: CPT

## 2022-02-28 ASSESSMENT — VISUAL ACUITY
OS_SC: 20/30-2
OD_SC: 20/25-2
OU_SC: J1

## 2022-02-28 ASSESSMENT — TONOMETRY
OS_IOP_MMHG: 13
OD_IOP_MMHG: 18

## 2022-03-14 ENCOUNTER — PATIENT OUTREACH (OUTPATIENT)
Dept: FAMILY MEDICINE CLINIC | Facility: CLINIC | Age: 77
End: 2022-03-14

## 2022-04-07 ENCOUNTER — RA CDI HCC (OUTPATIENT)
Dept: OTHER | Facility: HOSPITAL | Age: 77
End: 2022-04-07

## 2022-04-07 NOTE — PROGRESS NOTES
[address]  coding opportunities       Chart reviewed, no opportunity found: CHART REVIEWED, NO OPPORTUNITY FOUND        Patients Insurance     Medicare Insurance: Medicare

## 2022-04-22 ENCOUNTER — OFFICE VISIT (OUTPATIENT)
Dept: FAMILY MEDICINE CLINIC | Facility: CLINIC | Age: 77
End: 2022-04-22
Payer: MEDICARE

## 2022-04-22 VITALS
SYSTOLIC BLOOD PRESSURE: 136 MMHG | HEIGHT: 62 IN | DIASTOLIC BLOOD PRESSURE: 78 MMHG | BODY MASS INDEX: 27.23 KG/M2 | WEIGHT: 148 LBS

## 2022-04-22 DIAGNOSIS — M25.552 LEFT HIP PAIN: ICD-10-CM

## 2022-04-22 DIAGNOSIS — R73.03 PREDIABETES: ICD-10-CM

## 2022-04-22 DIAGNOSIS — H53.9 VISUAL DISTURBANCE: ICD-10-CM

## 2022-04-22 DIAGNOSIS — E78.5 HYPERLIPIDEMIA, UNSPECIFIED HYPERLIPIDEMIA TYPE: ICD-10-CM

## 2022-04-22 DIAGNOSIS — M25.561 CHRONIC PAIN OF RIGHT KNEE: ICD-10-CM

## 2022-04-22 DIAGNOSIS — G89.29 CHRONIC PAIN OF RIGHT KNEE: ICD-10-CM

## 2022-04-22 DIAGNOSIS — N18.30 STAGE 3 CHRONIC KIDNEY DISEASE, UNSPECIFIED WHETHER STAGE 3A OR 3B CKD (HCC): ICD-10-CM

## 2022-04-22 DIAGNOSIS — I10 HYPERTENSION, ESSENTIAL: Primary | ICD-10-CM

## 2022-04-22 DIAGNOSIS — I63.10 CEREBRAL INFARCTION DUE TO EMBOLISM OF PRECEREBRAL ARTERY (HCC): ICD-10-CM

## 2022-04-22 PROBLEM — R42 VERTIGO: Status: RESOLVED | Noted: 2021-12-09 | Resolved: 2022-04-22

## 2022-04-22 PROCEDURE — 36415 COLL VENOUS BLD VENIPUNCTURE: CPT | Performed by: FAMILY MEDICINE

## 2022-04-22 PROCEDURE — 99214 OFFICE O/P EST MOD 30 MIN: CPT | Performed by: FAMILY MEDICINE

## 2022-04-22 NOTE — ASSESSMENT & PLAN NOTE
Her systolic blood pressure is borderline today, diastolic blood pressure is good  Compared to her history, this is actually very good  Will get a CBC and CMP today  Will follow up when results are available

## 2022-04-22 NOTE — ASSESSMENT & PLAN NOTE
Lab Results   Component Value Date    EGFR 39 12/11/2021    EGFR 58 12/07/2021    CREATININE 1 34 (H) 12/11/2021    CREATININE 0 96 12/07/2021    CREATININE 0 84 08/06/2020   CMP today and follow up when results are available

## 2022-04-22 NOTE — PROGRESS NOTES
Assessment/Plan:  Hypertension, essential  Her systolic blood pressure is borderline today, diastolic blood pressure is good  Compared to her history, this is actually very good  Will get a CBC and CMP today  Will follow up when results are available  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[facility])  We encouraged her to remain compliant with her aspirin, Eliquis as well as amlodipine and atorvastatin  Will get CBC CMP and lipids today  Stage 3 chronic kidney disease, unspecified whether stage 3a or 3b CKD ([facility] [address] )  Lab Results   Component Value Date    EGFR 39 12/11/2021    EGFR 58 12/07/2021    CREATININE 1 34 (H) 12/11/2021    CREATININE 0 96 12/07/2021    CREATININE 0 84 08/06/2020   CMP today and follow up when results are available    Visual disturbance  Stroke related deficit  Continue to follow-up with Neurology  Continue with medications  Prediabetes  Will get hemoglobin A1c  Hyperlipidemia  Continue atorvastatin  Lipid profile today  Chronic pain of right knee  We will follow up with her when results of her hip in the films are available  Diagnoses and all orders for this visit:    Hypertension, essential  -     CBC  -     Comprehensive metabolic panel    Hyperlipidemia, unspecified hyperlipidemia type  -     Lipid panel    Left hip pain  -     XR hip/pelv 2-3 vws left if performed; Future    Stage 3 chronic kidney disease, unspecified whether stage 3a or 3b CKD (HCC)    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)    Visual disturbance    Prediabetes    Chronic pain of right knee        The patient declines routine immunizations other than her COVID vaccination  She has no plans on getting booster  She also declines other health maintenance recommendations at present  She also initially declined blood work today  I explained to her the importance of following up in regards to her lipids, blood sugar as well as kidney function X cetera    She did agree to this after explanation  Subjective:   Chief Complaint   Patient presents with    Follow-up     Med check        Patient ID: Darien Rod is a 78 y o  female  Vision slowly improving  No new deficits  No CVP c/o  Improving mild SOB  Tobacco slowly lessening, now down to less than 1/2 PPD  No Has, dizziness resolved  HPI  The patient is a 29-year-old female who presents today for routine follow-up multiple medical problems including essential hypertension, hyperlipidemia, pre diabetes as well as chronic kidney disease and recent episode of cerebral infarction felt to be embolic in nature  She was hospitalized in December  She had a CVA  This primarily affected her vision as well as balance  Her symptoms have significantly improved since her hospitalization but have not return to baseline as far as her vision is concerned  No embolic source of her CVA was definitively elucidated  She did have some atheroma in the ascending aorta with a moderate-size protruding sessile 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 arch proximal descending thoracic aorta  She was begun on anticoagulation  She has continued with the Eliquis and has had no evidence of hemorrhagic complication  She has remained compliant with her atorvastatin 80 as well as aspirin and amlodipine additionally  She has had no cardiovascular complaint  She does have mild shortness of breath which she feels is improving  She has continued to try to wean her tobacco usage  She is down to less than half pack per day from over 1 pack per day  She has no headache and her dizziness has resolved  She continues to have hip and knee pain  She was unable to get her diagnostic studies performed due to her acute event  She would like to go for these and follow up as she has had continued pain  She denies polyuria polydipsia polyphagia    The following portions of the patient's history were reviewed and updated as appropriate: allergies, current medications, past family history, past medical history, past social history, past surgical history and problem list     ROS    Per the HPI  Objective:    Physical Exam  Vitals and nursing note reviewed  Constitutional:       Comments: Somewhat overweight and in no acute distress   Neck:      Vascular: No carotid bruit  Cardiovascular:      Rate and Rhythm: Normal rate and regular rhythm  Heart sounds: No murmur heard  Pulmonary:      Effort: Pulmonary effort is normal       Breath sounds: Normal breath sounds  Musculoskeletal:      Right lower leg: No edema  Left lower leg: No edema  Lymphadenopathy:      Cervical: No cervical adenopathy  Neurological:      Mental Status: She is alert and oriented to person, place, and time  Comments: No gross focal neurologic deficit  She did continues to have some visual disturbance  I see no nystagmus today  Psychiatric:         Mood and Affect: Mood normal          Thought Content:  Thought content normal          Judgment: Judgment normal

## 2022-04-22 NOTE — ASSESSMENT & PLAN NOTE
We encouraged her to remain compliant with her aspirin, Eliquis as well as amlodipine and atorvastatin  Will get CBC CMP and lipids today

## 2022-04-25 LAB
ALBUMIN SERPL-MCNC: 4.5 G/DL (ref 3.6–5.1)
ALBUMIN/GLOB SERPL: 1.5 (CALC) (ref 1–2.5)
ALP SERPL-CCNC: 151 U/L (ref 37–153)
ALT SERPL-CCNC: 22 U/L (ref 6–29)
AST SERPL-CCNC: 22 U/L (ref 10–35)
BILIRUB SERPL-MCNC: 0.4 MG/DL (ref 0.2–1.2)
BUN SERPL-MCNC: 16 MG/DL (ref 7–25)
BUN/CREAT SERPL: ABNORMAL (CALC) (ref 6–22)
CALCIUM SERPL-MCNC: 10 MG/DL (ref 8.6–10.4)
CHLORIDE SERPL-SCNC: 104 MMOL/L (ref 98–110)
CHOLEST SERPL-MCNC: 158 MG/DL
CHOLEST/HDLC SERPL: 4.1 (CALC)
CO2 SERPL-SCNC: 24 MMOL/L (ref 20–32)
CREAT SERPL-MCNC: 0.88 MG/DL (ref 0.6–0.93)
ERYTHROCYTE [DISTWIDTH] IN BLOOD BY AUTOMATED COUNT: 14.7 % (ref 11–15)
GLOBULIN SER CALC-MCNC: 3.1 G/DL (CALC) (ref 1.9–3.7)
GLUCOSE SERPL-MCNC: 109 MG/DL (ref 65–99)
HBA1C MFR BLD: 5.7 % OF TOTAL HGB
HCT VFR BLD AUTO: 38.2 % (ref 35–45)
HDLC SERPL-MCNC: 39 MG/DL
HGB BLD-MCNC: 13 G/DL (ref 11.7–15.5)
LDLC SERPL CALC-MCNC: 96 MG/DL (CALC)
MCH RBC QN AUTO: 30.5 PG (ref 27–33)
MCHC RBC AUTO-ENTMCNC: 34 G/DL (ref 32–36)
MCV RBC AUTO: 89.7 FL (ref 80–100)
NONHDLC SERPL-MCNC: 119 MG/DL (CALC)
PLATELET # BLD AUTO: 320 THOUSAND/UL (ref 140–400)
PMV BLD REES-ECKER: 9.9 FL (ref 7.5–12.5)
POTASSIUM SERPL-SCNC: 3.5 MMOL/L (ref 3.5–5.3)
PROT SERPL-MCNC: 7.6 G/DL (ref 6.1–8.1)
RBC # BLD AUTO: 4.26 MILLION/UL (ref 3.8–5.1)
REF LAB TEST NAME: NORMAL
REF LAB TEST: NORMAL
SL AMB CLIENT CONTACT: NORMAL
SL AMB EGFR AFRICAN AMERICAN: 74 ML/MIN/1.73M2
SL AMB EGFR NON AFRICAN AMERICAN: 64 ML/MIN/1.73M2
SODIUM SERPL-SCNC: 138 MMOL/L (ref 135–146)
TRIGL SERPL-MCNC: 133 MG/DL
WBC # BLD AUTO: 7.2 THOUSAND/UL (ref 3.8–10.8)

## 2022-04-27 ENCOUNTER — HOSPITAL ENCOUNTER (OUTPATIENT)
Dept: RADIOLOGY | Facility: HOSPITAL | Age: 77
Discharge: HOME | End: 2022-04-27
Attending: FAMILY MEDICINE
Payer: MEDICARE

## 2022-04-27 DIAGNOSIS — M25.552 LEFT HIP PAIN: ICD-10-CM

## 2022-04-27 DIAGNOSIS — M25.562 CHRONIC PAIN OF LEFT KNEE: ICD-10-CM

## 2022-04-27 DIAGNOSIS — M25.561 CHRONIC PAIN OF RIGHT KNEE: ICD-10-CM

## 2022-04-27 DIAGNOSIS — M25.551 HIP PAIN, CHRONIC, RIGHT: ICD-10-CM

## 2022-04-27 DIAGNOSIS — G89.29 CHRONIC PAIN OF LEFT KNEE: ICD-10-CM

## 2022-04-27 DIAGNOSIS — G89.29 CHRONIC PAIN OF RIGHT KNEE: ICD-10-CM

## 2022-04-27 DIAGNOSIS — G89.29 HIP PAIN, CHRONIC, RIGHT: ICD-10-CM

## 2022-04-27 PROCEDURE — 73562 X-RAY EXAM OF KNEE 3: CPT

## 2022-04-27 PROCEDURE — 73522 X-RAY EXAM HIPS BI 3-4 VIEWS: CPT

## 2022-05-02 DIAGNOSIS — M17.0 PRIMARY OSTEOARTHRITIS OF BOTH KNEES: Primary | ICD-10-CM

## 2022-06-01 ENCOUNTER — OFFICE VISIT (OUTPATIENT)
Dept: OBGYN CLINIC | Facility: CLINIC | Age: 77
End: 2022-06-01
Payer: MEDICARE

## 2022-06-01 VITALS
BODY MASS INDEX: 27.42 KG/M2 | WEIGHT: 149 LBS | DIASTOLIC BLOOD PRESSURE: 88 MMHG | SYSTOLIC BLOOD PRESSURE: 180 MMHG | HEIGHT: 62 IN

## 2022-06-01 DIAGNOSIS — M17.11 PRIMARY OSTEOARTHRITIS OF RIGHT KNEE: Primary | ICD-10-CM

## 2022-06-01 DIAGNOSIS — M17.12 PRIMARY OSTEOARTHRITIS OF LEFT KNEE: ICD-10-CM

## 2022-06-01 PROCEDURE — 99204 OFFICE O/P NEW MOD 45 MIN: CPT | Performed by: ORTHOPAEDIC SURGERY

## 2022-06-01 NOTE — PROGRESS NOTES
Assessment:     1  Primary osteoarthritis of right knee    2  Primary osteoarthritis of left knee        Plan:     Problem List Items Addressed This Visit    None     Visit Diagnoses     Primary osteoarthritis of right knee    -  Primary    Relevant Orders    Brace    Primary osteoarthritis of left knee        Relevant Orders    Brace        Findings consistent with bilateral knee osteoarthritis severe medially  Imaging and prognosis reviewed with patient  Discussed conservative vs surgical intervention  Start with hinged knee braces to wear with activity  Also recommended regular low impact exercise stationary bike, elliptical, pool  If her symptoms continue then can try cortisone injection  If no significant relief then can try viscosupplementation  Last resort would be total knee replacement  She can continue with ibuprofen, also use aspercreme or voltaren gel  See patient back as needed  All patient's questions were answered to er satisfaction  This note is created using dictation transcription  It may contain typographical errors, grammatical errors, improperly dictated words, background noise and other errors  Subjective:     Patient ID: Henry Rubio is a 78 y o  female  Chief Complaint:  78 yr old female in for evaluation of bilateral knee pain  Patient had recent episode of cerebral infarction felt to be embolic in nature and was hospitalized in December due to CVA  She is on eliquis 5 mg  She has been taking ibuprofen for pain control  She states chronic pain for years gradually worsening w/o injury or trauma  Pain is primarily with weight bearing activities getting up from seated positions, walking, stairs, prolonged activity  Rest alleviates but if she sits to long knee's get stiff and ache  Pain anterior-posterior aspects of her knee's  Denies any locking or instability, notes cracking, popping in her knee's  She has not had any formal treatment   Information on patient's intake form was reviewed  Allergy:  No Known Allergies  Medications:  all current active meds have been reviewed  Past Medical History:  Past Medical History:   Diagnosis Date    Hypertension      Past Surgical History:  History reviewed  No pertinent surgical history  Family History:  Family History   Problem Relation Age of Onset    Heart disease Mother     Heart disease Father     Crohn's disease Son     Mental illness Son     No Known Problems Daughter     No Known Problems Daughter     No Known Problems Son     No Known Problems Son     No Known Problems Son      Social History:  Social History     Substance and Sexual Activity   Alcohol Use Yes    Comment: rare     Social History     Substance and Sexual Activity   Drug Use No     Social History     Tobacco Use   Smoking Status Current Every Day Smoker    Packs/day: 1 00   Smokeless Tobacco Never Used     Review of Systems   Constitutional: Negative for chills and fever  HENT: Negative for ear pain and sore throat  Eyes: Negative for pain and visual disturbance  Respiratory: Negative for cough and shortness of breath  Cardiovascular: Negative for chest pain and palpitations  Gastrointestinal: Negative for abdominal pain and vomiting  Genitourinary: Negative for dysuria and hematuria  Musculoskeletal: Negative for arthralgias, back pain, gait problem and joint swelling  Skin: Negative for color change and rash  Neurological: Negative for seizures and syncope  Psychiatric/Behavioral: Negative  All other systems reviewed and are negative  Objective:  BP Readings from Last 1 Encounters:   06/01/22 (!) 180/88      Wt Readings from Last 1 Encounters:   06/01/22 67 6 kg (149 lb)      BMI:   Estimated body mass index is 27 25 kg/m² as calculated from the following:    Height as of this encounter: 5' 2" (1 575 m)  Weight as of this encounter: 67 6 kg (149 lb)    BSA:   Estimated body surface area is 1 69 meters squared as calculated from the following:    Height as of this encounter: 5' 2" (1 575 m)  Weight as of this encounter: 67 6 kg (149 lb)  Physical Exam  Vitals and nursing note reviewed  Constitutional:       Appearance: Normal appearance  She is well-developed  HENT:      Head: Normocephalic and atraumatic  Right Ear: External ear normal       Left Ear: External ear normal    Eyes:      Extraocular Movements: Extraocular movements intact  Conjunctiva/sclera: Conjunctivae normal    Pulmonary:      Effort: Pulmonary effort is normal    Musculoskeletal:         General: Tenderness (bilateral knee arthralgia ) present  Cervical back: Neck supple  Right knee: No effusion  Instability Tests: Medial Marleny test negative and lateral Marleny test negative  Left knee: No effusion  Instability Tests: Medial Marleny test negative and lateral Marleny test negative  Skin:     General: Skin is warm and dry  Neurological:      Mental Status: She is alert and oriented to person, place, and time  Deep Tendon Reflexes: Reflexes are normal and symmetric  Psychiatric:         Mood and Affect: Mood normal          Behavior: Behavior normal        Right Knee Exam     Muscle Strength   The patient has normal right knee strength  Tenderness   The patient is experiencing no tenderness  Range of Motion   Extension: normal   Flexion: normal     Tests   Marleny:  Medial - negative Lateral - negative  Varus: negative Valgus: negative  Patellar apprehension: negative    Other   Erythema: absent  Scars: absent  Sensation: normal  Pulse: present  Swelling: none  Effusion: no effusion present    Comments:  Crepitation with motion of patella   Varus alignment of knee      Left Knee Exam     Muscle Strength   The patient has normal left knee strength  Tenderness   The patient is experiencing no tenderness       Range of Motion   Extension: normal   Flexion: normal     Tests   Marleny:  Medial - negative Lateral - negative  Varus: negative Valgus: negative  Patellar apprehension: negative    Other   Erythema: absent  Scars: absent  Sensation: normal  Pulse: present  Swelling: none  Effusion: no effusion present    Comments:  Crepitation with motion of patella   Varus alignment of knee            I have personally reviewed pertinent films in PACS and my interpretation is xr bilateral knee's demonstrates tricompartmental osteoarthritis severe medial with sclerosis, osteophytes in all 3 compartments, chondrocalcinosis      Scribe Attestation    I,:  Chelsey Whitaker am acting as a scribe while in the presence of the attending physician :       I,:  Dolly Shea MD personally performed the services described in this documentation    as scribed in my presence :

## 2022-06-15 DIAGNOSIS — I63.9 STROKE (HCC): ICD-10-CM

## 2022-06-15 DIAGNOSIS — I10 HYPERTENSION, ESSENTIAL: ICD-10-CM

## 2022-06-15 RX ORDER — AMLODIPINE BESYLATE 10 MG/1
10 TABLET ORAL DAILY
Qty: 90 TABLET | Refills: 1 | Status: SHIPPED | OUTPATIENT
Start: 2022-06-15

## 2022-06-15 RX ORDER — ATORVASTATIN CALCIUM 80 MG/1
80 TABLET, FILM COATED ORAL EVERY EVENING
Qty: 90 TABLET | Refills: 1 | Status: SHIPPED | OUTPATIENT
Start: 2022-06-15

## 2022-06-16 ENCOUNTER — IOP CHECK (OUTPATIENT)
Dept: URBAN - METROPOLITAN AREA CLINIC 6 | Facility: CLINIC | Age: 77
End: 2022-06-16

## 2022-06-16 DIAGNOSIS — H40.033: ICD-10-CM

## 2022-06-16 DIAGNOSIS — H40.053: ICD-10-CM

## 2022-06-16 PROCEDURE — 92083 EXTENDED VISUAL FIELD XM: CPT

## 2022-06-16 PROCEDURE — 92012 INTRM OPH EXAM EST PATIENT: CPT

## 2022-06-16 ASSESSMENT — VISUAL ACUITY: OS_SC: 20/30

## 2022-06-16 ASSESSMENT — TONOMETRY
OD_IOP_MMHG: 18
OS_IOP_MMHG: 20

## 2022-08-29 ENCOUNTER — RA CDI HCC (OUTPATIENT)
Dept: OTHER | Facility: HOSPITAL | Age: 77
End: 2022-08-29

## 2022-09-13 ENCOUNTER — OFFICE VISIT (OUTPATIENT)
Dept: FAMILY MEDICINE CLINIC | Facility: CLINIC | Age: 77
End: 2022-09-13
Payer: MEDICARE

## 2022-09-13 VITALS
DIASTOLIC BLOOD PRESSURE: 72 MMHG | HEIGHT: 62 IN | BODY MASS INDEX: 28.16 KG/M2 | WEIGHT: 153 LBS | SYSTOLIC BLOOD PRESSURE: 146 MMHG

## 2022-09-13 DIAGNOSIS — N18.30 STAGE 3 CHRONIC KIDNEY DISEASE, UNSPECIFIED WHETHER STAGE 3A OR 3B CKD (HCC): ICD-10-CM

## 2022-09-13 DIAGNOSIS — I63.9 STROKE (HCC): ICD-10-CM

## 2022-09-13 DIAGNOSIS — I63.10 CEREBRAL INFARCTION DUE TO EMBOLISM OF PRECEREBRAL ARTERY (HCC): Primary | ICD-10-CM

## 2022-09-13 DIAGNOSIS — H53.9 VISUAL DISTURBANCE: ICD-10-CM

## 2022-09-13 DIAGNOSIS — E78.5 HYPERLIPIDEMIA, UNSPECIFIED HYPERLIPIDEMIA TYPE: ICD-10-CM

## 2022-09-13 DIAGNOSIS — I10 HYPERTENSION, ESSENTIAL: ICD-10-CM

## 2022-09-13 PROBLEM — R06.02 SOB (SHORTNESS OF BREATH): Status: RESOLVED | Noted: 2021-10-22 | Resolved: 2022-09-13

## 2022-09-13 PROCEDURE — 99214 OFFICE O/P EST MOD 30 MIN: CPT | Performed by: FAMILY MEDICINE

## 2022-09-13 RX ORDER — ATORVASTATIN CALCIUM 80 MG/1
80 TABLET, FILM COATED ORAL EVERY EVENING
Qty: 90 TABLET | Refills: 0 | Status: SHIPPED | OUTPATIENT
Start: 2022-09-13

## 2022-09-13 RX ORDER — AMLODIPINE BESYLATE 10 MG/1
10 TABLET ORAL DAILY
Qty: 90 TABLET | Refills: 0 | Status: SHIPPED | OUTPATIENT
Start: 2022-09-13

## 2022-09-13 NOTE — PROGRESS NOTES
Assessment/Plan:  Hypertension, essential  Blood pressure remains mildly elevated from a systolic standpoint  Home pressure is typically normal   Diastolic acceptable  Continue with amlodipine 10  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[facility])  I recommended that she continue with aspirin and Eliquis as well as statin  She canceled her follow-up with Neurology but I strongly encouraged her to reschedule this  She agrees and we are going to send this request     Stage 3 chronic kidney disease, unspecified whether stage 3a or 3b CKD ([facility] [address] )  Lab Results   Component Value Date    EGFR 39 12/11/2021    EGFR 58 12/07/2021    CREATININE 0 88 04/22/2022    CREATININE 1 34 (H) 12/11/2021    CREATININE 0 96 12/07/2021   Follow-up creatinine/EGFR was normal    Visual disturbance  Persistent visual field defect related to her CVA    Hyperlipidemia  Continue with atorvastatin as well as bike riding  The patient is a 82-year-old female who presents today for follow-up of multiple medical problems  She appears to be doing well  She is going to continue with her current dose of amlodipine, Eliquis, aspirin and atorvastatin  She is encouraged to continue with regular exercise as well as watching her diet  She will need to schedule an appointment with her next primary physician due to [place] [facility]  She also needs to make a follow-up appointment with Neurology  She should make with Cardiology as well  She is somewhat noncompliant with recommendations over the years  She declines all vaccinations as well as other health maintenance parameter such as mammography, colonoscopy, DEXA scan X cetera  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 and we wish her all the best of health and happiness in her future  Diagnoses and all orders for this visit:    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)    Stroke (HCC)  -     apixaban (Eliquis) 5 mg;  Take 1 tablet (5 mg total) by mouth 2 (two) times a day  -     atorvastatin (LIPITOR) 80 mg tablet; Take 1 tablet (80 mg total) by mouth every evening  -     Ambulatory Referral to Neurology; Future    Hypertension, essential  -     amLODIPine (NORVASC) 10 mg tablet; Take 1 tablet (10 mg total) by mouth daily    Stage 3 chronic kidney disease, unspecified whether stage 3a or 3b CKD (HCC)    Visual disturbance    Hyperlipidemia, unspecified hyperlipidemia type          Subjective:   Chief Complaint   Patient presents with    Follow-up     Med check        Patient ID: Danika Garcia is a 78 y o  female  HPI  The patient is a 82-year-old female who presents today for routine follow-up of essential hypertension, embolic CVA, hyperlipidemia, pre diabetes among other  She was hospitalized last December with an acute CVA  MRI revealed recent infarcts noted in bilateral cerebellar hemispheres, left thalamus as well as right occipital lobe and right parietal occipital junction compatible with embolic phenomenon  She also had noted chronic left occipital lobe infarct  CTA of the head neck revealed mild to moderate carotid stenosis on the right and mile on the left  She also had occluded left vertebral as well as some moderate protruding sessile atheromatous in the distal aortic Actos and proximal descending thoracic aorta  It was felt that the CVA was probably related to atheroembolic phenomenon  It was recommended that she continue with aspirin, Eliquis as well as statin and blood pressure control  She has remained compliant with this though she believe she may be only taking Eliquis once a day and feels that she is not receiving adequate prescription to take b i d  She received this from her mail-order pharmacy  We noted that we did send in as 180 pills and b i d  She denies any recurrent symptoms  She does have some persistent visual disturbance though she feels that may be slowly improving    She has no focal motor or sensory residual   No other cranial nerve residual   The following portions of the patient's history were reviewed and updated as appropriate: allergies, current medications, past family history, past medical history, past social history, past surgical history and problem list     ROS    Per the HPI  Otherwise negative  Objective:    Physical Exam  Vitals and nursing note reviewed  Constitutional:       Appearance: Normal appearance  Eyes:      Pupils: Pupils are equal, round, and reactive to light  Neck:      Vascular: No carotid bruit  Comments: No JVD  Cardiovascular:      Rate and Rhythm: Normal rate and regular rhythm  Heart sounds: No murmur heard  No gallop  Pulmonary:      Effort: Pulmonary effort is normal       Breath sounds: Normal breath sounds  No wheezing or rales  Neurological:      Mental Status: She is alert and oriented to person, place, and time  Psychiatric:         Mood and Affect: Mood normal          Thought Content:  Thought content normal          Judgment: Judgment normal

## 2022-09-13 NOTE — ASSESSMENT & PLAN NOTE
Lab Results   Component Value Date    EGFR 39 12/11/2021    EGFR 58 12/07/2021    CREATININE 0 88 04/22/2022    CREATININE 1 34 (H) 12/11/2021    CREATININE 0 96 12/07/2021   Follow-up creatinine/EGFR was normal

## 2022-09-13 NOTE — ASSESSMENT & PLAN NOTE
I recommended that she continue with aspirin and Eliquis as well as statin  She canceled her follow-up with Neurology but I strongly encouraged her to reschedule this    She agrees and we are going to send this request

## 2022-09-13 NOTE — ASSESSMENT & PLAN NOTE
Blood pressure remains mildly elevated from a systolic standpoint  Home pressure is typically normal   Diastolic acceptable  Continue with amlodipine 10

## 2022-09-29 ENCOUNTER — TELEPHONE (OUTPATIENT)
Dept: NEUROLOGY | Facility: CLINIC | Age: 77
End: 2022-09-29

## 2022-11-29 ENCOUNTER — TELEPHONE (OUTPATIENT)
Dept: NEUROLOGY | Facility: CLINIC | Age: 77
End: 2022-11-29

## 2022-11-29 NOTE — TELEPHONE ENCOUNTER
I called and spoke with patient to assist  her in  rescheduling  her appointment due to provider will be out of office  Patient ask for a later date and senait to time and location of appointment

## 2023-01-12 ENCOUNTER — OFFICE VISIT (OUTPATIENT)
Dept: FAMILY MEDICINE CLINIC | Facility: CLINIC | Age: 78
End: 2023-01-12

## 2023-01-12 VITALS
WEIGHT: 159 LBS | OXYGEN SATURATION: 98 % | HEIGHT: 62 IN | TEMPERATURE: 98.4 F | RESPIRATION RATE: 18 BRPM | HEART RATE: 89 BPM | DIASTOLIC BLOOD PRESSURE: 80 MMHG | BODY MASS INDEX: 29.26 KG/M2 | SYSTOLIC BLOOD PRESSURE: 142 MMHG

## 2023-01-12 DIAGNOSIS — I63.9 STROKE (HCC): ICD-10-CM

## 2023-01-12 DIAGNOSIS — N18.30 STAGE 3 CHRONIC KIDNEY DISEASE, UNSPECIFIED WHETHER STAGE 3A OR 3B CKD (HCC): ICD-10-CM

## 2023-01-12 DIAGNOSIS — E78.5 HYPERLIPIDEMIA, UNSPECIFIED HYPERLIPIDEMIA TYPE: ICD-10-CM

## 2023-01-12 DIAGNOSIS — I63.10 CEREBRAL INFARCTION DUE TO EMBOLISM OF PRECEREBRAL ARTERY (HCC): ICD-10-CM

## 2023-01-12 DIAGNOSIS — I10 HYPERTENSION, ESSENTIAL: Primary | ICD-10-CM

## 2023-01-12 DIAGNOSIS — R73.9 HYPERGLYCEMIA: ICD-10-CM

## 2023-01-12 RX ORDER — AMLODIPINE BESYLATE AND BENAZEPRIL HYDROCHLORIDE 5; 10 MG/1; MG/1
1 CAPSULE ORAL DAILY
Qty: 90 CAPSULE | Refills: 1 | Status: SHIPPED | OUTPATIENT
Start: 2023-01-12

## 2023-01-12 RX ORDER — ATORVASTATIN CALCIUM 80 MG/1
80 TABLET, FILM COATED ORAL EVERY EVENING
Qty: 90 TABLET | Refills: 1 | Status: SHIPPED | OUTPATIENT
Start: 2023-01-12

## 2023-01-12 NOTE — ASSESSMENT & PLAN NOTE
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Patient was encouraged to continue with current dose of Eliquis and aspirin and follow-up with neurologist as scheduled from March 1, 2023

## 2023-01-12 NOTE — ASSESSMENT & PLAN NOTE
Hypertension  Patient blood pressure above goal and was given prescription to switch her amlodipine to Lotrel 5-10 mg 1 p o  daily  She was encouraged to have follow-up with [facility] cardiology in regards to discussing results of Zio patch results

## 2023-01-12 NOTE — ASSESSMENT & PLAN NOTE
Hyperlipidemia    Patient was given prescription to obtain lipid panel blood work and continue with current dose of statin therapy

## 2023-01-12 NOTE — PROGRESS NOTES
Assessment and Plan:     Problem List Items Addressed This Visit        Cardiovascular and Mediastinum    Hypertension, essential - Primary     Hypertension  Patient blood pressure above goal and was given prescription to switch her amlodipine to Lotrel 5-10 mg 1 p o  daily  She was encouraged to have follow-up with [facility]ology in regards to discussing results of Zio patch results  Relevant Medications    amLODIPine-benazepril (LOTREL 5-10) 5-10 MG per capsule    Other Relevant Orders    CBC    Comprehensive metabolic panel    Lipid panel    TSH, 3rd generation    Ambulatory Referral to Cardiology    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[facility])     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Patient was encouraged to continue with current dose of Eliquis and aspirin and follow-up with neurologist as scheduled from March 1, 2023  Relevant Orders    CBC    Comprehensive metabolic panel    Lipid panel    TSH, 3rd generation       Genitourinary    Stage 3 chronic kidney disease, unspecified whether stage 3a or 3b CKD (HCC)    Relevant Orders    CBC    Comprehensive metabolic panel    Lipid panel    TSH, 3rd generation       Other    Hyperlipidemia     Hyperlipidemia  Patient was given prescription to obtain lipid panel blood work and continue with current dose of statin therapy         Relevant Medications    atorvastatin (LIPITOR) 80 mg tablet    Other Relevant Orders    CBC    Comprehensive metabolic panel    Lipid panel    TSH, 3rd generation   Other Visit Diagnoses     Hyperglycemia        Relevant Orders    Hemoglobin A1C    Stroke (HCC)        Relevant Medications    atorvastatin (LIPITOR) 80 mg tablet    apixaban (Eliquis) 5 mg    Other Relevant Orders    Ambulatory Referral to Cardiology           Preventive health issues were discussed with patient, and age appropriate screening tests were ordered as noted in patient's After Visit Summary    Personalized health advice and appropriate referrals for health education or preventive services given if needed, as noted in patient's After Visit Summary  History of Present Illness:     Patient presents for a Medicare Wellness Visit    Patient in the office to establish new PCP  She had previously been a patient of [facility]  I reviewed past medical records showing a stroke that occurred in December 2021  Patient states she has decreased visual acuity described as clouding or blurring  She has seen ophthalmologist since her stroke but vision has not improved with any corrective lenses  Patient admits to not liking to see any physicians  She also is not in favor of taking a lot of medications  Unfortunately she continues to smoke 1/2 pack of cigarettes per day  Patient states she had a [place] patch that was placed last spring but never received any follow-up regarding results of patch  Patient is seeing neurologist on March 1, 2023 due to the urging of her previous PCP to have follow-up  Patient was interested in seeing if she could discontinue any of her current medications  I reviewed results of Zio patch showing some variability in heart rate as well as Mobitz type II heart block  Patient denies any chest pain or shortness of breath  Patient Care Team:  Boris Stanford MD as PCP - General (Family Medicine)     Review of Systems:     Review of Systems   Constitutional: Negative  HENT: Negative  Eyes: Positive for visual disturbance  Respiratory: Negative  Cardiovascular: Negative  Gastrointestinal: Negative  Genitourinary: Negative  Musculoskeletal: Negative  Skin: Negative  Neurological: Negative  Psychiatric/Behavioral: Negative           Problem List:     Patient Active Problem List   Diagnosis   • Hyperlipidemia   • Hypertension, essential   • Prediabetes   • Hip pain, chronic, right   • Chronic pain of right knee   • Stage 3 chronic kidney disease, unspecified whether stage 3a or 3b CKD (HCC)   • Visual disturbance   • 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[facility])      Past Medical and Surgical History:     Past Medical History:   Diagnosis Date   • Hypertension      No past surgical history on file  Family History:     Family History   Problem Relation Age of Onset   • Heart disease Mother    • Heart disease Father    • Crohn's disease Son    • Mental illness Son    • No Known Problems Daughter    • No Known Problems Daughter    • No Known Problems Son    • No Known Problems Son    • No Known Problems Son       Social History:     Social History     Socioeconomic History   • Marital status: Single     Spouse name: None   • Number of children: None   • Years of education: None   • Highest education level: None   Occupational History   • None   Tobacco Use   • Smoking status: Every Day     Packs/day: 1 00     Types: Cigarettes   • Smokeless tobacco: Never   Vaping Use   • Vaping Use: Never used   Substance and Sexual Activity   • Alcohol use: Yes     Comment: rare   • Drug use: No   • Sexual activity: None   Other Topics Concern   • None   Social History Narrative   • None     Social Determinants of Health     Financial Resource Strain: Low Risk    • Difficulty of Paying Living Expenses: Not very hard   Food Insecurity: Not on file   Transportation Needs: No Transportation Needs   • Lack of Transportation (Medical): No   • Lack of Transportation (Non-Medical):  No   Physical Activity: Not on file   Stress: Not on file   Social Connections: Not on file   Intimate Partner Violence: Not on file   Housing Stability: Not on file      Medications and Allergies:     Current Outpatient Medications   Medication Sig Dispense Refill   • amLODIPine (NORVASC) 10 mg tablet Take 1 tablet (10 mg total) by mouth daily 90 tablet 0   • amLODIPine-benazepril (LOTREL 5-10) 5-10 MG per capsule Take 1 capsule by mouth daily 90 capsule 1   • apixaban (Eliquis) 5 mg Take 1 tablet (5 mg total) by mouth 2 (two) times a day 180 tablet 1   • aspirin (ECOTRIN LOW STRENGTH) 81 mg EC tablet Take 1 tablet (81 mg total) by mouth daily 90 tablet 1   • atorvastatin (LIPITOR) 80 mg tablet Take 1 tablet (80 mg total) by mouth every evening 90 tablet 1     No current facility-administered medications for this visit  No Known Allergies   Immunizations:     Immunization History   Administered Date(s) Administered   • COVID-19 PFIZER VACCINE 0 3 ML IM 04/13/2021, 05/06/2021      Health Maintenance:         Topic Date Due   • DXA SCAN  Never done   • Breast Cancer Screening: Mammogram  04/22/2023 (Originally 1/9/1985)   • Hepatitis C Screening  Completed         Topic Date Due   • Pneumococcal Vaccine: 65+ Years (1 - PCV) Never done   • COVID-19 Vaccine (3 - Booster for Pfizer series) 07/01/2021      Medicare Screening Tests and Risk Assessments:         Health Risk Assessment:   Patient rates overall health as good  Patient feels that their physical health rating is slightly better  Patient is satisfied with their life  Eyesight was rated as same  Hearing was rated as same  Patient feels that their emotional and mental health rating is same  Patients states they are never, rarely angry  Patient states they are never, rarely unusually tired/fatigued  Pain experienced in the last 7 days has been some  Patient's pain rating has been 4/10  Patient states that she has experienced no weight loss or gain in last 6 months  Depression Screening:   PHQ-2 Score: 1      Fall Risk Screening: In the past year, patient has experienced: no history of falling in past year      Urinary Incontinence Screening:   Patient has not leaked urine accidently in the last six months  Home Safety:  Patient does not have trouble with stairs inside or outside of their home  Patient has working smoke alarms and has working carbon monoxide detector  Home safety hazards include: none  Nutrition:   Current diet is Regular  Medications:   Patient is currently taking over-the-counter supplements   OTC medications include: see medication list  Patient is able to manage medications  Activities of Daily Living (ADLs)/Instrumental Activities of Daily Living (IADLs):   Walk and transfer into and out of bed and chair?: Yes  Dress and groom yourself?: Yes    Bathe or shower yourself?: Yes    Feed yourself? Yes  Do your laundry/housekeeping?: Yes  Manage your money, pay your bills and track your expenses?: Yes  Make your own meals?: Yes    Do your own shopping?: Yes    Previous Hospitalizations:   Any hospitalizations or ED visits within the last 12 months?: No      Advance Care Planning:   Living will: Yes    Advanced directive: Yes      PREVENTIVE SCREENINGS      Cardiovascular Screening:    General: History Lipid Disorder and Risks and Benefits Discussed    Due for: Lipid Panel      Diabetes Screening:     General: Risks and Benefits Discussed    Due for: Blood Glucose      Colorectal Cancer Screening:     General: Patient Declines      Breast Cancer Screening:     General: Patient Declines      Cervical Cancer Screening:    General: Screening Not Indicated      Lung Cancer Screening:     General: Screening Not Indicated      Hepatitis C Screening:    General: Screening Current      Preventive Screening Comments: Patient declines any recommended vaccinations as well as any recommended testing such as mammograms or colonoscopies  Screening, Brief Intervention, and Referral to Treatment (SBIRT)    Screening    Typical number of drinks in a week: 1    Single Item Drug Screening:  How often have you used an illegal drug (including marijuana) or a prescription medication for non-medical reasons in the past year? never    Single Item Drug Screen Score: 0  Interpretation: Negative screen for possible drug use disorder    No results found       Physical Exam:     /80   Pulse 89   Temp 98 4 °F (36 9 °C)   Resp 18   Ht 5' 2" (1 575 m)   Wt 72 1 kg (159 lb)   SpO2 98%   BMI 29 08 kg/m²     Physical Exam  Vitals and nursing note reviewed  Constitutional:       General: She is not in acute distress  Appearance: Normal appearance  She is well-developed  She is not ill-appearing  HENT:      Head: Normocephalic and atraumatic  Eyes:      General:         Right eye: No discharge  Left eye: No discharge  Extraocular Movements: Extraocular movements intact  Conjunctiva/sclera: Conjunctivae normal       Pupils: Pupils are equal, round, and reactive to light  Neck:      Vascular: No carotid bruit  Cardiovascular:      Rate and Rhythm: Normal rate and regular rhythm  Heart sounds: No murmur heard  Pulmonary:      Effort: Pulmonary effort is normal  No respiratory distress  Breath sounds: Normal breath sounds  Abdominal:      General: Abdomen is flat  Bowel sounds are normal       Palpations: Abdomen is soft  Tenderness: There is no abdominal tenderness  There is no guarding or rebound  Musculoskeletal:      Right lower leg: No edema  Left lower leg: No edema  Lymphadenopathy:      Cervical: No cervical adenopathy  Skin:     General: Skin is warm and dry  Capillary Refill: Capillary refill takes less than 2 seconds  Neurological:      Mental Status: She is alert  Mental status is at baseline  Psychiatric:         Mood and Affect: Mood normal          Behavior: Behavior normal          Thought Content:  Thought content normal          Judgment: Judgment normal           Noah Dow MD

## 2023-01-12 NOTE — PATIENT INSTRUCTIONS
Medicare Preventive Visit Patient Instructions  Thank you for completing your Welcome to Medicare Visit or Medicare Annual Wellness Visit today  Your next wellness visit will be due in one year (1/13/2024)  The screening/preventive services that you may require over the next 5-10 years are detailed below  Some tests may not apply to you based off risk factors and/or age  Screening tests ordered at today's visit but not completed yet may show as past due  Also, please note that scanned in results may not display below  Preventive Screenings:  Service Recommendations Previous Testing/Comments   Colorectal Cancer Screening  * Colonoscopy    * Fecal Occult Blood Test (FOBT)/Fecal Immunochemical Test (FIT)  * Fecal DNA/Cologuard Test  * Flexible Sigmoidoscopy Age: 39-70 years old   Colonoscopy: every 10 years (may be performed more frequently if at higher risk)  OR  FOBT/FIT: every 1 year  OR  Cologuard: every 3 years  OR  Sigmoidoscopy: every 5 years  Screening may be recommended earlier than age 39 if at higher risk for colorectal cancer  Also, an individualized decision between you and your healthcare provider will decide whether screening between the ages of 74-80 would be appropriate  Colonoscopy: Not on file  FOBT/FIT: Not on file  Cologuard: Not on file  Sigmoidoscopy: Not on file          Breast Cancer Screening Age: 36 years old  Frequency: every 1-2 years  Not required if history of left and right mastectomy Mammogram: Not on file        Cervical Cancer Screening Between the ages of 21-29, pap smear recommended once every 3 years  Between the ages of 33-67, can perform pap smear with HPV co-testing every 5 years     Recommendations may differ for women with a history of total hysterectomy, cervical cancer, or abnormal pap smears in past  Pap Smear: Not on file    Screening Not Indicated   Hepatitis C Screening Once for adults born between [facility]  More frequently in patients at high risk for Hepatitis C Hep C Antibody: 12/09/2021    Screening Current   Diabetes Screening 1-2 times per year if you're at risk for diabetes or have pre-diabetes Fasting glucose: No results in last 5 years (No results in last 5 years)  A1C: 5 7 % of total Hgb (4/22/2022)  Screening Current   Cholesterol Screening Once every 5 years if you don't have a lipid disorder  May order more often based on risk factors  Lipid panel: 04/22/2022    Screening Not Indicated  History Lipid Disorder     Other Preventive Screenings Covered by Medicare:  1  Abdominal Aortic Aneurysm (AAA) Screening: covered once if your at risk  You're considered to be at risk if you have a family history of AAA  2  Lung Cancer Screening: covers low dose CT scan once per year if you meet all of the following conditions: (1) Age 50-69; (2) No signs or symptoms of lung cancer; (3) Current smoker or have quit smoking within the last 15 years; (4) You have a tobacco smoking history of at least 20 pack years (packs per day multiplied by number of years you smoked); (5) You get a written order from a healthcare provider  3  Glaucoma Screening: covered annually if you're considered high risk: (1) You have diabetes OR (2) Family history of glaucoma OR (3)  aged 48 and older OR (3)  American aged 72 and older  3  Osteoporosis Screening: covered every 2 years if you meet one of the following conditions: (1) You're estrogen deficient and at risk for osteoporosis based off medical history and other findings; (2) Have a vertebral abnormality; (3) On glucocorticoid therapy for more than 3 months; (4) Have primary hyperparathyroidism; (5) On osteoporosis medications and need to assess response to drug therapy  · Last bone density test (DXA Scan): Not on file  5  HIV Screening: covered annually if you're between the age of 12-76  Also covered annually if you are younger than 13 and older than 72 with risk factors for HIV infection   For pregnant patients, it is covered up to 3 times per pregnancy  Immunizations:  Immunization Recommendations   Influenza Vaccine Annual influenza vaccination during flu season is recommended for all persons aged >= 6 months who do not have contraindications   Pneumococcal Vaccine   * Pneumococcal conjugate vaccine = PCV13 (Prevnar 13), PCV15 (Vaxneuvance), PCV20 (Prevnar 20)  * Pneumococcal polysaccharide vaccine = PPSV23 (Pneumovax) Adults 25-60 years old: 1-3 doses may be recommended based on certain risk factors  Adults 72 years old: 1-2 doses may be recommended based off what pneumonia vaccine you previously received   Hepatitis B Vaccine 3 dose series if at intermediate or high risk (ex: diabetes, end stage renal disease, liver disease)   Tetanus (Td) Vaccine - COST NOT COVERED BY MEDICARE PART B Following completion of primary series, a booster dose should be given every 10 years to maintain immunity against tetanus  Td may also be given as tetanus wound prophylaxis  Tdap Vaccine - COST NOT COVERED BY MEDICARE PART B Recommended at least once for all adults  For pregnant patients, recommended with each pregnancy  Shingles Vaccine (Shingrix) - COST NOT COVERED BY MEDICARE PART B  2 shot series recommended in those aged 48 and above     Health Maintenance Due:      Topic Date Due   • DXA SCAN  Never done   • Breast Cancer Screening: Mammogram  04/22/2023 (Originally 1/9/1985)   • Hepatitis C Screening  Completed     Immunizations Due:      Topic Date Due   • Pneumococcal Vaccine: 65+ Years (1 - PCV) Never done   • COVID-19 Vaccine (3 - Booster for [place] series) 07/01/2021     Advance Directives   What are advance directives? Advance directives are legal documents that state your wishes and plans for medical care  These plans are made ahead of time in case you lose your ability to make decisions for yourself   Advance directives can apply to any medical decision, such as the treatments you want, and if you want to donate organs  What are the types of advance directives? There are many types of advance directives, and each state has rules about how to use them  You may choose a combination of any of the following:  · Living will: This is a written record of the treatment you want  You can also choose which treatments you do not want, which to limit, and which to stop at a certain time  This includes surgery, medicine, IV fluid, and tube feedings  · Durable power of  for healthcare [facility]): This is a written record that states who you want to make healthcare choices for you when you are unable to make them for yourself  This person, called a proxy, is usually a family member or a friend  You may choose more than 1 proxy  · Do not resuscitate (DNR) order:  A DNR order is used in case your heart stops beating or you stop breathing  It is a request not to have certain forms of treatment, such as CPR  A DNR order may be included in other types of advance directives  · Medical directive: This covers the care that you want if you are in a coma, near death, or unable to make decisions for yourself  You can list the treatments you want for each condition  Treatment may include pain medicine, surgery, blood transfusions, dialysis, IV or tube feedings, and a ventilator (breathing machine)  · Values history: This document has questions about your views, beliefs, and how you feel and think about life  This information can help others choose the care that you would choose  Why are advance directives important? An advance directive helps you control your care  Although spoken wishes may be used, it is better to have your wishes written down  Spoken wishes can be misunderstood, or not followed  Treatments may be given even if you do not want them  An advance directive may make it easier for your family to make difficult choices about your care     Cigarette Smoking and Your Health   Risks to your health if you smoke: Nicotine and other chemicals found in tobacco damage every cell in your body  Even if you are a light smoker, you have an increased risk for cancer, heart disease, and lung disease  If you are pregnant or have diabetes, smoking increases your risk for complications  Benefits to your health if you stop smoking:   · You decrease respiratory symptoms such as coughing, wheezing, and shortness of breath  · You reduce your risk for cancers of the lung, mouth, throat, kidney, bladder, pancreas, stomach, and cervix  If you already have cancer, you increase the benefits of chemotherapy  You also reduce your risk for cancer returning or a second cancer from developing  · You reduce your risk for heart disease, blood clots, heart attack, and stroke  · You reduce your risk for lung infections, and diseases such as pneumonia, asthma, chronic bronchitis, and emphysema  · Your circulation improves  More oxygen can be delivered to your body  If you have diabetes, you lower your risk for complications, such as kidney, artery, and eye diseases  You also lower your risk for nerve damage  Nerve damage can lead to amputations, poor vision, and blindness  · You improve your body's ability to heal and to fight infections  For more information and support to stop smoking:   · [employer]  Phone: 5- 012 - [phone]  Web Address: www [employer]  Weight Management   Why it is important to manage your weight:  Being overweight increases your risk of health conditions such as heart disease, high blood pressure, type 2 diabetes, and certain types of cancer  It can also increase your risk for osteoarthritis, sleep apnea, and other respiratory problems  Aim for a slow, steady weight loss  Even a small amount of weight loss can lower your risk of health problems  How to lose weight safely:  A safe and healthy way to lose weight is to eat fewer calories and get regular exercise   You can lose up about 1 pound a week by decreasing the number of calories you eat by 500 calories each day  Healthy meal plan for weight management:  A healthy meal plan includes a variety of foods, contains fewer calories, and helps you stay healthy  A healthy meal plan includes the following:  · Eat whole-grain foods more often  A healthy meal plan should contain fiber  Fiber is the part of grains, fruits, and vegetables that is not broken down by your body  Whole-grain foods are healthy and provide extra fiber in your diet  Some examples of whole-grain foods are whole-wheat breads and pastas, oatmeal, brown rice, and bulgur  · Eat a variety of vegetables every day  Include dark, leafy greens such as spinach, kale, christina greens, and mustard greens  Eat yellow and orange vegetables such as carrots, sweet potatoes, and winter squash  · Eat a variety of fruits every day  Choose fresh or canned fruit (canned in its own juice or light syrup) instead of juice  Fruit juice has very little or no fiber  · Eat low-fat dairy foods  Drink fat-free (skim) milk or 1% milk  Eat fat-free yogurt and low-fat cottage cheese  Try low-fat cheeses such as mozzarella and other reduced-fat cheeses  · Choose meat and other protein foods that are low in fat  Choose beans or other legumes such as split peas or lentils  Choose fish, skinless poultry (chicken or turkey), or lean cuts of red meat (beef or pork)  Before you cook meat or poultry, cut off any visible fat  · Use less fat and oil  Try baking foods instead of frying them  Add less fat, such as margarine, sour cream, regular salad dressing and mayonnaise to foods  Eat fewer high-fat foods  Some examples of high-fat foods include french fries, doughnuts, ice cream, and cakes  · Eat fewer sweets  Limit foods and drinks that are high in sugar  This includes candy, cookies, regular soda, and sweetened drinks  Exercise:  Exercise at least 30 minutes per day on most days of the week   Some examples of exercise include walking, biking, dancing, and swimming  You can also fit in more physical activity by taking the stairs instead of the elevator or parking farther away from stores  Ask your healthcare provider about the best exercise plan for you  © Copyright [employer] 2018 Information is for End User's use only and may not be sold, redistributed or otherwise used for commercial purposes   All illustrations and images included in CareNotes® are the copyrighted property of A [employer] M , Inc  or [address] [place] [address]

## 2023-02-02 ENCOUNTER — ESTABLISHED COMPREHENSIVE EXAM (OUTPATIENT)
Dept: URBAN - METROPOLITAN AREA CLINIC 6 | Facility: CLINIC | Age: 78
End: 2023-02-02

## 2023-02-02 DIAGNOSIS — H35.3120: ICD-10-CM

## 2023-02-02 DIAGNOSIS — H40.033: ICD-10-CM

## 2023-02-02 DIAGNOSIS — H25.813: ICD-10-CM

## 2023-02-02 PROCEDURE — 92014 COMPRE OPH EXAM EST PT 1/>: CPT

## 2023-02-02 PROCEDURE — 92020 GONIOSCOPY: CPT

## 2023-02-02 PROCEDURE — 92202 OPSCPY EXTND ON/MAC DRAW: CPT

## 2023-02-02 PROCEDURE — 92133 CPTRZD OPH DX IMG PST SGM ON: CPT

## 2023-02-02 ASSESSMENT — VISUAL ACUITY
OS_SC: 20/25+2
OD_SC: 20/25+2
OU_CC: J1+

## 2023-02-02 ASSESSMENT — TONOMETRY
OD_IOP_MMHG: 17
OS_IOP_MMHG: 21

## 2023-02-03 DIAGNOSIS — I10 HYPERTENSION, ESSENTIAL: ICD-10-CM

## 2023-02-03 RX ORDER — AMLODIPINE BESYLATE AND BENAZEPRIL HYDROCHLORIDE 5; 10 MG/1; MG/1
1 CAPSULE ORAL DAILY
Qty: 90 CAPSULE | Refills: 1 | Status: SHIPPED | OUTPATIENT
Start: 2023-02-03

## 2023-02-09 ENCOUNTER — TELEPHONE (OUTPATIENT)
Dept: FAMILY MEDICINE CLINIC | Facility: CLINIC | Age: 78
End: 2023-02-09

## 2023-02-09 DIAGNOSIS — I10 HYPERTENSION, ESSENTIAL: Primary | ICD-10-CM

## 2023-02-09 RX ORDER — AMLODIPINE BESYLATE 5 MG/1
5 TABLET ORAL DAILY
Qty: 30 TABLET | Refills: 5 | Status: SHIPPED | OUTPATIENT
Start: 2023-02-09 | End: 2023-04-13

## 2023-02-09 RX ORDER — BENAZEPRIL HYDROCHLORIDE 10 MG/1
10 TABLET ORAL DAILY
Qty: 30 TABLET | Refills: 5 | Status: SHIPPED | OUTPATIENT
Start: 2023-02-09 | End: 2023-04-13

## 2023-02-09 NOTE — TELEPHONE ENCOUNTER
Patient is stating that the amlodipine-benazepril is on backorder and they aren't sure when its going to be available  She is requesting an alternative for now  She would like to keep the amlodipine-benazapril on order but needs something in the meantime  Please advise

## 2023-02-09 NOTE — TELEPHONE ENCOUNTER
They may not have the amlodipine-benazepril combination however they should be able to provide the same medication individually to maintain the same dose that she is currently taking    I will place order in pharmacy to try and keep medications same however she would just take 1 tablet of each once daily

## 2023-03-03 ENCOUNTER — CONSULT (OUTPATIENT)
Dept: CARDIOLOGY CLINIC | Facility: CLINIC | Age: 78
End: 2023-03-03

## 2023-03-03 VITALS
SYSTOLIC BLOOD PRESSURE: 130 MMHG | HEART RATE: 77 BPM | WEIGHT: 160.6 LBS | DIASTOLIC BLOOD PRESSURE: 70 MMHG | BODY MASS INDEX: 29.37 KG/M2

## 2023-03-03 DIAGNOSIS — I10 HYPERTENSION, ESSENTIAL: ICD-10-CM

## 2023-03-03 DIAGNOSIS — I63.9 STROKE (HCC): ICD-10-CM

## 2023-03-03 NOTE — PROGRESS NOTES
Cardiology             [facility] [place]  1945  [id number]              Assessment/Plan:     CVA, 12/2021  Dyslipidemia  Hypertension        Maintained on Eliquis  Continue following up with neurology for the same  Prior lipid panel reviewed, significant improved, continue statin therapy  Blood pressure slightly suboptimal   Amlodipine/benazepril has been prescribed by PCP  Following up with PCP for blood pressure control  EKG revealing sinus rhythm with PVC and nonspecific ST/T wave abnormality        Follow-up with our office as needed              Interval History:      This is a very pleasant 62-year-old female hospitalized 12/11/2021 secondary to blurry vision, MRI brain, diagnosed with CVA, suspicious for embolic pathology based on MRI  Leonor Kevin was unremarkable with the exception of a moderate size protruding sessile 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 arch and proximal descending thoracic aorta  She was discharged on Eliquis  Her LDL cholesterol was markedly elevated greater than 200 mg/dL and she was placed on atorvastatin  When she was seen for initial consultation 1/2022, she refused loop recorder placement, stating that she "does not do well with foreign objects in her body "  She was recommended to continue following up with PCP and neurology for control of her hypertension, dyslipidemia, and follow-up of CVA  She presents today for follow-up            Vitals:  Vitals:    03/03/23 1435   BP: 130/70   BP Location: Right arm   Patient Position: Sitting   Cuff Size: Large   Pulse: 77   Weight: 72 8 kg (160 lb 9 6 oz)         Past Medical History:   Diagnosis Date   • Hypertension      Social History     Socioeconomic History   • Marital status: Single     Spouse name: Not on file   • Number of children: Not on file   • Years of education: Not on file   • Highest education level: Not on file   Occupational History   • Not on file   Tobacco Use   • Smoking status: Every Day     Packs/day: 1 00 Types: Cigarettes   • Smokeless tobacco: Never   Vaping Use   • Vaping Use: Never used   Substance and Sexual Activity   • Alcohol use: Yes     Comment: rare   • Drug use: No   • Sexual activity: Not on file   Other Topics Concern   • Not on file   Social History Narrative   • Not on file     Social Determinants of Health     Financial Resource Strain: Low Risk    • Difficulty of Paying Living Expenses: Not very hard   Food Insecurity: Not on file   Transportation Needs: No Transportation Needs   • Lack of Transportation (Medical): No   • Lack of Transportation (Non-Medical): No   Physical Activity: Not on file   Stress: Not on file   Social Connections: Not on file   Intimate Partner Violence: Not on file   Housing Stability: Not on file      Family History   Problem Relation Age of Onset   • Heart disease Mother    • Heart disease Father    • Crohn's disease Son    • Mental illness Son    • No Known Problems Daughter    • No Known Problems Daughter    • No Known Problems Son    • No Known Problems Son    • No Known Problems Son      No past surgical history on file      Current Outpatient Medications:   •  amLODIPine-benazepril (LOTREL 5-10) 5-10 MG per capsule, Take 1 capsule by mouth daily, Disp: 90 capsule, Rfl: 1  •  apixaban (Eliquis) 5 mg, Take 1 tablet (5 mg total) by mouth 2 (two) times a day, Disp: 180 tablet, Rfl: 1  •  aspirin (ECOTRIN LOW STRENGTH) 81 mg EC tablet, Take 1 tablet (81 mg total) by mouth daily, Disp: 90 tablet, Rfl: 1  •  atorvastatin (LIPITOR) 80 mg tablet, Take 1 tablet (80 mg total) by mouth every evening, Disp: 90 tablet, Rfl: 1  •  amLODIPine (NORVASC) 10 mg tablet, Take 1 tablet (10 mg total) by mouth daily (Patient not taking: Reported on 3/3/2023), Disp: 90 tablet, Rfl: 0  •  amLODIPine (NORVASC) 5 mg tablet, Take 1 tablet (5 mg total) by mouth daily (Patient not taking: Reported on 3/3/2023), Disp: 30 tablet, Rfl: 5  •  benazepril (LOTENSIN) 10 mg tablet, Take 1 tablet (10 mg total) by mouth daily (Patient not taking: Reported on 3/3/2023), Disp: 30 tablet, Rfl: 5        Review of Systems:  Review of Systems   Constitutional: Negative for activity change, fever and unexpected weight change  HENT: Negative for facial swelling, nosebleeds and voice change  Respiratory: Negative for chest tightness, shortness of breath and wheezing  Cardiovascular: Negative for chest pain, palpitations and leg swelling  Gastrointestinal: Negative for abdominal distention  Genitourinary: Negative for hematuria  Musculoskeletal: Negative for arthralgias  Knee pain   Skin: Negative for color change, pallor, rash and wound  Neurological: Negative for dizziness, seizures and syncope  Psychiatric/Behavioral: Negative for agitation  Physical Exam:  Physical Exam  Vitals reviewed  Constitutional:       Appearance: She is well-developed  HENT:      Head: Normocephalic and atraumatic  Cardiovascular:      Rate and Rhythm: Normal rate and regular rhythm  Heart sounds: Normal heart sounds  Pulmonary:      Effort: Pulmonary effort is normal       Breath sounds: Normal breath sounds  Abdominal:      Palpations: Abdomen is soft  Musculoskeletal:         General: Normal range of motion  Cervical back: Normal range of motion and neck supple  Skin:     General: Skin is warm and dry  Neurological:      Mental Status: She is alert and oriented to person, place, and time  Psychiatric:         Behavior: Behavior normal          Thought Content: Thought content normal          Judgment: Judgment normal          This note was completed in part utilizing [employer] Fluency Direct Software  Grammatical errors, random word insertions, spelling mistakes, and incomplete sentences can be an occasional consequence of this system secondary to software limitations, ambient noise, and hardware issues    If you have any questions or concerns about the content, text, or information contained within the body of this dictation, please contact the provider for clarification

## 2023-03-16 ENCOUNTER — OFFICE VISIT (OUTPATIENT)
Dept: FAMILY MEDICINE CLINIC | Facility: CLINIC | Age: 78
End: 2023-03-16

## 2023-03-16 VITALS
BODY MASS INDEX: 28.73 KG/M2 | TEMPERATURE: 98 F | SYSTOLIC BLOOD PRESSURE: 157 MMHG | DIASTOLIC BLOOD PRESSURE: 85 MMHG | RESPIRATION RATE: 18 BRPM | WEIGHT: 156.13 LBS | HEIGHT: 62 IN | OXYGEN SATURATION: 100 % | HEART RATE: 68 BPM

## 2023-03-16 DIAGNOSIS — S63.502A SPRAIN OF LEFT WRIST, INITIAL ENCOUNTER: ICD-10-CM

## 2023-03-16 DIAGNOSIS — M25.551 HIP PAIN, CHRONIC, RIGHT: Primary | ICD-10-CM

## 2023-03-16 DIAGNOSIS — G89.29 HIP PAIN, CHRONIC, RIGHT: Primary | ICD-10-CM

## 2023-03-16 RX ORDER — TRAMADOL HYDROCHLORIDE 50 MG/1
50 TABLET ORAL 3 TIMES DAILY PRN
Qty: 270 TABLET | Refills: 0 | Status: SHIPPED | OUTPATIENT
Start: 2023-03-16

## 2023-03-16 RX ORDER — TRAMADOL HYDROCHLORIDE 50 MG/1
50 TABLET ORAL 3 TIMES DAILY PRN
Qty: 90 TABLET | Refills: 0 | Status: SHIPPED | OUTPATIENT
Start: 2023-03-16

## 2023-03-16 NOTE — ASSESSMENT & PLAN NOTE
Sprain of left wrist   Patient was given an Ace bandage to immobilize wrist for the time being  She will continue with ice treatments 3 times daily for 10 to 15 minutes  She was given prescription to switch her ibuprofen to tramadol 50 mg 1 p o  3 times daily as needed for her wrist as well as for her knee pains  I explained the risks involved with continued use of NSAIDs as well as being on Eliquis    I explained she may exacerbate her chronic kidney disease as well as increased risk of GI bleeding

## 2023-03-16 NOTE — PROGRESS NOTES
FAMILY PRACTICE OFFICE VISIT       NAME: Eduardo Garibay  AGE: 70 y o  SEX: female       : 1945        MRN: [id number]    DATE: 3/16/2023  TIME: 5:13 PM    Assessment and Plan     Problem List Items Addressed This Visit        Musculoskeletal and Integument    Sprain of left wrist     Sprain of left wrist   Patient was given an Ace bandage to immobilize wrist for the time being  She will continue with ice treatments 3 times daily for 10 to 15 minutes  She was given prescription to switch her ibuprofen to tramadol 50 mg 1 p o  3 times daily as needed for her wrist as well as for her knee pains  I explained the risks involved with continued use of NSAIDs as well as being on Eliquis  I explained she may exacerbate her chronic kidney disease as well as increased risk of GI bleeding            Other    Hip pain, chronic, right - Primary    Relevant Medications    traMADol (Ultram) 50 mg tablet    traMADol (Ultram) 50 mg tablet           Chief Complaint     Chief Complaint   Patient presents with   • twisted wrist        History of Present Illness     Patient in the office with 2-day history of left wrist and hand pain after picking up an object and twisting her hand  She states she has had similar injuries to her left wrist and hand about 4 times in her life  Symptoms initially caused significant discomfort and over time resolve  She denies falling or hitting her hand or wrist in any manner  Unfortunately patient states she has been on ibuprofen 3 times a day for the past 2 years due to her chronic arthralgias of her knees from degenerative joint disease  She is aware of risks involved with continuing this medication while on Eliquis due to her prior history of stroke  Review of Systems   Review of Systems   Constitutional: Negative  Respiratory: Negative  Cardiovascular: Negative  Gastrointestinal: Negative  Musculoskeletal: Positive for arthralgias and gait problem         Active Problem List     Patient Active Problem List   Diagnosis   • Hyperlipidemia   • Hypertension, essential   • Prediabetes   • Hip pain, chronic, right   • Chronic pain of right knee   • Stage 3 chronic kidney disease, unspecified whether stage 3a or 3b CKD (HCC)   • Visual disturbance   • 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([facility] [address] )   • Sprain of left wrist       Past Medical History:  Past Medical History:   Diagnosis Date   • Hypertension        Past Surgical History:  History reviewed  No pertinent surgical history  Family History:  Family History   Problem Relation Age of Onset   • Heart disease Mother    • Heart disease Father    • Crohn's disease Son    • Mental illness Son    • No Known Problems Daughter    • No Known Problems Daughter    • No Known Problems Son    • No Known Problems Son    • No Known Problems Son        Social History:  Social History     Socioeconomic History   • Marital status: Single     Spouse name: Not on file   • Number of children: Not on file   • Years of education: Not on file   • Highest education level: Not on file   Occupational History   • Not on file   Tobacco Use   • Smoking status: Every Day     Packs/day: 1 00     Types: Cigarettes   • Smokeless tobacco: Never   Vaping Use   • Vaping Use: Never used   Substance and Sexual Activity   • Alcohol use: Yes     Comment: rare   • Drug use: No   • Sexual activity: Yes   Other Topics Concern   • Not on file   Social History Narrative   • Not on file     Social Determinants of Health     Financial Resource Strain: Low Risk    • Difficulty of Paying Living Expenses: Not very hard   Food Insecurity: Not on file   Transportation Needs: No Transportation Needs   • Lack of Transportation (Medical): No   • Lack of Transportation (Non-Medical):  No   Physical Activity: Not on file   Stress: Not on file   Social Connections: Not on file   Intimate Partner Violence: Not on file   Housing Stability: Not on file       Objective     Vitals:    03/16/23 1414   BP: 157/85   Pulse: 68   Resp: 18   Temp: 98 °F (36 7 °C)   SpO2: 100%     Wt Readings from Last 3 Encounters:   03/16/23 70 8 kg (156 lb 2 oz)   03/03/23 72 8 kg (160 lb 9 6 oz)   01/12/23 72 1 kg (159 lb)       Physical Exam  Constitutional:       General: She is not in acute distress  Appearance: Normal appearance  She is not ill-appearing  Musculoskeletal:         General: Tenderness and signs of injury present  No swelling or deformity  Comments: Patient with minimal swelling of left wrist area  Normal range of motion but increased discomfort with flexion and extension of wrist   Muscle strength +4/5 limited by pain  Negative point tenderness to palpation  Neurological:      Mental Status: She is alert  Mental status is at baseline  Psychiatric:         Mood and Affect: Mood normal          Behavior: Behavior normal          Thought Content:  Thought content normal          Judgment: Judgment normal          Pertinent Laboratory/Diagnostic Studies:  Lab Results   Component Value Date    BUN 16 04/22/2022    CREATININE 0 88 04/22/2022    CALCIUM 10 0 04/22/2022     (L) 02/17/2016    K 3 5 04/22/2022    CO2 24 04/22/2022     04/22/2022     Lab Results   Component Value Date    ALT 22 04/22/2022    AST 22 04/22/2022    ALKPHOS 151 04/22/2022    BILITOT 0 4 02/17/2016       Lab Results   Component Value Date    WBC 7 2 04/22/2022    HGB 13 0 04/22/2022    HCT 38 2 04/22/2022    MCV 89 7 04/22/2022     04/22/2022       Lab Results   Component Value Date    TSH 4 99 (H) 08/06/2020       Lab Results   Component Value Date    CHOL 270 (H) 02/17/2016     Lab Results   Component Value Date    TRIG 133 04/22/2022     Lab Results   Component Value Date    HDL 39 (L) 04/22/2022     Lab Results   Component Value Date    LDLCALC 96 04/22/2022     Lab Results   Component Value Date    HGBA1C 5 7 (H) 04/22/2022       Results for orders placed or performed in visit on 04/22/22   CBC Result Value Ref Range    White Blood Cell Count 7 2 3 8 - 10 8 Thousand/uL    Red Blood Cell Count 4 26 3 80 - 5 10 Million/uL    Hemoglobin 13 0 11 7 - 15 5 g/dL    HCT 38 2 35 0 - 45 0 %    MCV 89 7 80 0 - 100 0 fL    MCH 30 5 27 0 - 33 0 pg    MCHC 34 0 32 0 - 36 0 g/dL    RDW 14 7 11 0 - 15 0 %    Platelet Count 348 094 - 400 Thousand/uL    SL AMB MPV 9 9 7 5 - 12 5 fL   Comprehensive metabolic panel   Result Value Ref Range    Glucose, Random 109 (H) 65 - 99 mg/dL    BUN 16 7 - 25 mg/dL    Creatinine 0 88 0 60 - 0 93 mg/dL    eGFR Non  64 > OR = 60 mL/min/1 73m2    eGFR  74 > OR = 60 mL/min/1 73m2    SL AMB BUN/CREATININE RATIO NOT APPLICABLE 6 - 22 (calc)    Sodium 138 135 - 146 mmol/L    Potassium 3 5 3 5 - 5 3 mmol/L    Chloride 104 98 - 110 mmol/L    CO2 24 20 - 32 mmol/L    Calcium 10 0 8 6 - 10 4 mg/dL    Protein, Total 7 6 6 1 - 8 1 g/dL    Albumin 4 5 3 6 - 5 1 g/dL    Globulin 3 1 1 9 - 3 7 g/dL (calc)    Albumin/Globulin Ratio 1 5 1 0 - 2 5 (calc)    TOTAL BILIRUBIN 0 4 0 2 - 1 2 mg/dL    Alkaline Phosphatase 151 37 - 153 U/L    AST 22 10 - 35 U/L    ALT 22 6 - 29 U/L   Lipid panel   Result Value Ref Range    Total Cholesterol 158 <200 mg/dL    HDL 39 (L) > OR = 50 mg/dL    Triglycerides 133 <150 mg/dL    LDL Calculated 96 mg/dL (calc)    Chol HDLC Ratio 4 1 <5 0 (calc)    Non-HDL Cholesterol 119 <130 mg/dL (calc)   Test Authorization   Result Value Ref Range    Test Name  HEMOGLOBIN A1c     Test Code  496SB     Client Contact JANICE METZ     Report Always Message Signature      COMMENT     Hemoglobin A1c (w/out EAG)   Result Value Ref Range    Hemoglobin A1C 5 7 (H) <5 7 % of total Hgb       No orders of the defined types were placed in this encounter        ALLERGIES:  No Known Allergies    Current Medications     Current Outpatient Medications   Medication Sig Dispense Refill   • amLODIPine-benazepril (LOTREL 5-10) 5-10 MG per capsule Take 1 capsule by mouth daily 90 capsule 1   • apixaban (Eliquis) 5 mg Take 1 tablet (5 mg total) by mouth 2 (two) times a day 180 tablet 1   • aspirin (ECOTRIN LOW STRENGTH) 81 mg EC tablet Take 1 tablet (81 mg total) by mouth daily 90 tablet 1   • atorvastatin (LIPITOR) 80 mg tablet Take 1 tablet (80 mg total) by mouth every evening 90 tablet 1   • traMADol (Ultram) 50 mg tablet Take 1 tablet (50 mg total) by mouth 3 (three) times a day as needed for moderate pain 90 tablet 0   • traMADol (Ultram) 50 mg tablet Take 1 tablet (50 mg total) by mouth 3 (three) times a day as needed for moderate pain 270 tablet 0   • amLODIPine (NORVASC) 10 mg tablet Take 1 tablet (10 mg total) by mouth daily (Patient not taking: Reported on 3/3/2023) 90 tablet 0   • amLODIPine (NORVASC) 5 mg tablet Take 1 tablet (5 mg total) by mouth daily (Patient not taking: Reported on 3/3/2023) 30 tablet 5   • benazepril (LOTENSIN) 10 mg tablet Take 1 tablet (10 mg total) by mouth daily (Patient not taking: Reported on 3/3/2023) 30 tablet 5     No current facility-administered medications for this visit           Health Maintenance     Health Maintenance   Topic Date Due   • DXA SCAN  Never done   • Pneumococcal Vaccine: 65+ Years (1 - PCV) Never done   • Osteoporosis Screening  Never done   • COVID-19 Vaccine (3 - Booster for [place] series) 07/01/2021   • BMI: Followup Plan  10/22/2022   • Breast Cancer Screening: Mammogram  04/22/2023 (Originally 1/9/1987)   • Influenza Vaccine (1) 06/30/2023 (Originally 9/1/2022)   • Fall Risk  01/12/2024   • Depression Screening  01/12/2024   • Urinary Incontinence Screening  01/12/2024   • Medicare Annual Wellness Visit (AWV)  01/12/2024   • BMI: Adult  03/16/2024   • Hepatitis C Screening  Completed   • HIB Vaccine  Aged Out   • IPV Vaccine  Aged Out   • Hepatitis A Vaccine  Aged Out   • Meningococcal ACWY Vaccine  Aged Out   • HPV Vaccine  Aged Out     Immunization History   Administered Date(s) Administered   • COVID-19 PFIZER VACCINE 0 3 ML IM 04/13/2021, 36/02/4264       Dale Mtz MD

## 2023-04-13 PROBLEM — F17.200 TOBACCO USE DISORDER: Status: ACTIVE | Noted: 2023-04-13

## 2023-04-27 ENCOUNTER — OFFICE VISIT (OUTPATIENT)
Dept: OBGYN CLINIC | Facility: MEDICAL CENTER | Age: 78
End: 2023-04-27

## 2023-04-27 VITALS
HEART RATE: 63 BPM | BODY MASS INDEX: 26.87 KG/M2 | SYSTOLIC BLOOD PRESSURE: 186 MMHG | HEIGHT: 62 IN | WEIGHT: 146 LBS | DIASTOLIC BLOOD PRESSURE: 83 MMHG

## 2023-04-27 DIAGNOSIS — M19.90 OSTEOARTHRITIS: ICD-10-CM

## 2023-04-27 DIAGNOSIS — M25.551 RIGHT HIP PAIN: ICD-10-CM

## 2023-04-27 DIAGNOSIS — M17.0 PRIMARY OSTEOARTHRITIS OF BOTH KNEES: ICD-10-CM

## 2023-04-27 NOTE — PROGRESS NOTES
Knee New Office Note    Assessment:     1  Chronic pain of both knees    2  Right hip pain    3  Osteoarthritis        Plan:  Findings today are consistent with bilateral knee osteoarthritis  Imaging and prognosis was reviewed with the patient today  Discussed conservative treatment options in the form of cortisone steroid injection, VISCO injections, anti-inflammatories, low impact exercises and Voltaren gel  Patient is not experiencing consistent pain at this time, I would recommend not proceeding with cortisone injections today- patient agreed  Continue with anti-inflammatories/tylenol as needed for pain  Continue staying active with low impact exercises such as flat surface walking, stationary biking and swimming  Follow up as needed     Problem List Items Addressed This Visit    None  Visit Diagnoses     Chronic pain of both knees        Right hip pain        Osteoarthritis             Subjective:     Patient ID: Maxwell Jewell is a 70 y o  female  Chief Complaint:  HPI:  The patient presents with a chief complaint of bilateral knee pain  The pain began several year(s) ago and is not associated with an acute injury  She states that two weeks ago she woke up and she was in extreme pain in bilateral hip and knees so she presented to the hospital on 4/11/2023  They gave her muscle relaxer and tylenol and by the end of the day the pain subsided  She states that the pain in her knee returned to baseline and she no longer is experiencing hip pain  The patient describes the pain as aching and 2 out of 10 in intensity  It is intermittent in timing, and localizes the pain to the posterior knee  The pain is worse with walking, standing, ascending stairs, descending stairs, squatting and kneeling and relieved with rest, medication: Tylenol used and beneficial, avoiding painful activities  She denies mechanical symptoms such as locking and catching  She denies instability of the knee    Patient has not had nay previous treatment for her knees  Allergy:  No Known Allergies  Medications:  all current active meds have been reviewed  Past Medical History:  Past Medical History:   Diagnosis Date   • 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([facility] ) 12/11/2021    Multiple embolic strokes on MRI 90/68/0840   • Hyperlipidemia 3/9/2017    Continue with healthy diet exercise program when home renovations have been completed  • Hypertension    • Prediabetes 3/9/2017   • Stage 3 chronic kidney disease, unspecified whether stage 3a or 3b CKD ([facility] ) 12/9/2021   • Tobacco use disorder 4/13/2023     Past Surgical History:  History reviewed  No pertinent surgical history    Family History:  Family History   Problem Relation Age of Onset   • Heart disease Mother    • Heart disease Father    • Crohn's disease Son    • Mental illness Son    • No Known Problems Daughter    • No Known Problems Daughter    • No Known Problems Son    • No Known Problems Son    • No Known Problems Son      Social History:  Social History     Substance and Sexual Activity   Alcohol Use Not Currently    Comment: rare     Social History     Substance and Sexual Activity   Drug Use No     Social History     Tobacco Use   Smoking Status Every Day   • Packs/day: 0 50   • Years: 68 00   • Pack years: 34 00   • Types: Cigarettes   Smokeless Tobacco Never           ROS:  General: Per HPI  Skin: Negative, except if noted below  HEENT: Negative  Respiratory: Negative  Cardiovascular: Negative  Gastrointestinal: Negative  Urinary: Negative  Vascular: Negative  Musculoskeletal: Positive per HPI   Neurologic: Positive per HPI  Endocrine: Negative    Objective:  BP Readings from Last 1 Encounters:   04/27/23 (!) 186/83      Wt Readings from Last 1 Encounters:   04/27/23 66 2 kg (146 lb)        Respiratory:   non-labored respirations    Lymphatics:  no palpable lymph nodes    Gait:   altered    Neurologic:   Alert and oriented times 3  Patient with normal sensation except as noted "below  Deep tendon reflexes 2+ except as noted in MSK exam    Bilateral Lower Extremity:  Left Knee: Inspection:  Skin intact     Overall limb alignment varus    Effusion: none    ROM 0-125 w/o pain    Extensor Lag: none    Palpation: no Joint line tenderness to palpation    AP Stability at 90 deg stable    M/L stability in full extension stable    M/L stability in midflexion stable    Motor: 5/5 IP/Q/HS/TA/GS    Pulses: 2+ DP / 2+ PT    SILT DP/SP/S/S/TN    Right knee: Inspection:  Skin intact     Overall limb alignment varus    Effusion: none    ROM 0-125 w/o pain    Extensor Lag: none    Palpation: no Joint line tenderness to palpation    AP Stability at 90 deg stable    M/L stability in full extension stable    M/L stability in midflexion stable    Motor: 5/5 IP/Q/HS/TA/GS    Pulses: 2+ DP / 2+ PT    SILT DP/SP/S/S/TN    Imaging:  My interpretation XR AP scanogram/AP bilateral knee/lateral/brasher/sunrise bialteral knee: severe joint space narrowing, subchondral sclerosis, subchondral cysts, osteophyte formation  No fracture or dislocation  BMI:   Estimated body mass index is 26 7 kg/m² as calculated from the following:    Height as of this encounter: 5' 2\" (1 575 m)  Weight as of this encounter: 66 2 kg (146 lb)  BSA:   Estimated body surface area is 1 67 meters squared as calculated from the following:    Height as of this encounter: 5' 2\" (1 575 m)  Weight as of this encounter: 66 2 kg (146 lb)  Scribe Attestation    I,:   am acting as a scribe while in the presence of the attending physician :       I,:   personally performed the services described in this documentation    as scribed in my presence  :           "

## 2023-05-16 DIAGNOSIS — S76.211D INGUINAL STRAIN, RIGHT, SUBSEQUENT ENCOUNTER: ICD-10-CM

## 2023-05-16 RX ORDER — METHOCARBAMOL 500 MG/1
500 TABLET, FILM COATED ORAL 4 TIMES DAILY PRN
Qty: 120 TABLET | Refills: 0 | Status: SHIPPED | OUTPATIENT
Start: 2023-05-16 | End: 2023-08-22 | Stop reason: SDUPTHER

## 2023-05-16 RX ORDER — METHOCARBAMOL 500 MG/1
500 TABLET, FILM COATED ORAL 4 TIMES DAILY PRN
Qty: 20 TABLET | Refills: 2 | Status: CANCELLED | OUTPATIENT
Start: 2023-05-16

## 2023-06-01 ENCOUNTER — OFFICE VISIT (OUTPATIENT)
Dept: NEUROLOGY | Facility: CLINIC | Age: 78
End: 2023-06-01

## 2023-06-01 VITALS
SYSTOLIC BLOOD PRESSURE: 130 MMHG | OXYGEN SATURATION: 97 % | HEART RATE: 82 BPM | HEIGHT: 62 IN | DIASTOLIC BLOOD PRESSURE: 80 MMHG | BODY MASS INDEX: 25.4 KG/M2 | WEIGHT: 138 LBS

## 2023-06-01 DIAGNOSIS — I63.40 EMBOLIC CEREBRAL INFARCTION (HCC): Primary | ICD-10-CM

## 2023-06-01 DIAGNOSIS — I63.9 STROKE (HCC): ICD-10-CM

## 2023-06-01 NOTE — PROGRESS NOTES
Patient ID: Parvez Dennis is a 70 y o  female  Assessment/Plan: This is a 69 y/o F who is here as a follow up for embolic CVA  Patient does not want ILR placed, and has been fine on eliquis for stroke prevention  PLAN:      Diagnoses and all orders for this visit:    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[facility])  Etiology of the CVA -   -for stroke prevention continue with combination of aspirin, eliquis and atorvastatin  -BP goal < 130/80, BP is at goal in the office  -LDL goal <70, last LDL   -does not smoke at this time   -no snoring  -I advised patient to avoid using NSAIDs for headaches or other pain and to stick to tylenol if needed  -Recommend mediterranean diet & regular exercise regimen atleast 4-5 times a week for 20-30 minutes  -I educated patient/family regarding medication compliance      Stroke [facility])  -     Ambulatory Referral to Neurology       Follow up - as needed  I would be happy to see the patient sooner if any new questions/concerns arise  Patient/Guardian was advised to the call the office if they have any questions and concerns in the meantime  Patient/Guardian does understand that if they have any new stroke like symptoms such as facial droop on one side, weakness/paralysis on either side, speech trouble, numbness on one side, balance issues, any vision changes, extreme dizziness or any new headache, to call 9-1-1 immediately or to proceed to the nearest ER immediately  Subjective:    HPI    81-year-old female who is here as a follow-up of history of embolic strokes  Patient is doing well  Denies any UTI associated symptoms patient by medications  She is tolerating her medications well without any issues she is currently maintained on Eliquis and atorvastatin without any side effects  She has no other complaints at this time  She does have residual blurred vision and does follow-up with ophthalmology every 6 months to a year    She was currently discharged from cardiology recently while she is doing well  She feels comfortable driving short distances she states  The following portions of the patient's history were reviewed and updated as appropriate:   She  has a past medical history of 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/2021), Hyperlipidemia (3/9/2017), Hypertension, Prediabetes (3/9/2017), Stage 3 chronic kidney disease, unspecified whether stage 3a or 3b CKD ([facility] ) (12/9/2021), and Tobacco use disorder (4/13/2023)  She   Patient Active Problem List    Diagnosis Date Noted   • Tobacco use disorder 04/13/2023   • Sprain of left wrist 32/82/0909   • 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([facility] ) 12/11/2021   • Stage 3 chronic kidney disease, unspecified whether stage 3a or 3b CKD ([place] [id number] ) 12/09/2021   • Visual disturbance 12/09/2021   • Hip pain, chronic, right 10/22/2021   • Chronic pain of right knee 10/22/2021   • Hyperlipidemia 03/09/2017   • Prediabetes 03/09/2017   • Hypertension, essential 12/17/2015     She  has no past surgical history on file  Her family history includes Crohn's disease in her son; Heart disease in her father and mother; Mental illness in her son; No Known Problems in her daughter, daughter, son, son, and son  She  reports that she has been smoking cigarettes  She has a 34 00 pack-year smoking history  She has never used smokeless tobacco  She reports that she does not currently use alcohol  She reports that she does not use drugs    Current Outpatient Medications   Medication Sig Dispense Refill   • amLODIPine-benazepril (LOTREL 5-10) 5-10 MG per capsule Take 1 capsule by mouth daily 90 capsule 1   • apixaban (Eliquis) 5 mg Take 1 tablet (5 mg total) by mouth 2 (two) times a day 180 tablet 1   • aspirin (ECOTRIN LOW STRENGTH) 81 mg EC tablet Take 1 tablet (81 mg total) by mouth daily 90 tablet 1   • atorvastatin (LIPITOR) 80 mg tablet Take 1 tablet (80 mg total) by mouth every evening 90 tablet 1   • methocarbamol (ROBAXIN) 500 mg tablet Take 1 "tablet (500 mg total) by mouth 4 (four) times a day as needed for muscle spasms 120 tablet 0     No current facility-administered medications for this visit  Current Outpatient Medications on File Prior to Visit   Medication Sig   • amLODIPine-benazepril (LOTREL 5-10) 5-10 MG per capsule Take 1 capsule by mouth daily   • apixaban (Eliquis) 5 mg Take 1 tablet (5 mg total) by mouth 2 (two) times a day   • aspirin (ECOTRIN LOW STRENGTH) 81 mg EC tablet Take 1 tablet (81 mg total) by mouth daily   • atorvastatin (LIPITOR) 80 mg tablet Take 1 tablet (80 mg total) by mouth every evening   • methocarbamol (ROBAXIN) 500 mg tablet Take 1 tablet (500 mg total) by mouth 4 (four) times a day as needed for muscle spasms     No current facility-administered medications on file prior to visit  She has No Known Allergies         Objective:    Blood pressure 130/80, pulse 82, height 5' 2\" (1 575 m), weight 62 6 kg (138 lb), SpO2 97 %, not currently breastfeeding  Physical Exam  General - patient is alert   Speech - no dysarthria noted, no aphasia noted  Neuro:   Cranial nerves: PERRL, EOMI, facial sensation intact to soft touch in V1, V2 and V3, no facial asymmetry noted, uvula/palate midline, tongue midline  Motor: 5/5 throughout, normal tone, no pronator drift noted  Sensory - intact to soft touch throughout  Reflexes - 2+ throughout  Coordination - no ataxia/dysmetria noted  Gait - normal     ROS:  Reviewed ROS   Review of Systems   Constitutional: Negative  Negative for appetite change and fever  HENT: Negative  Negative for hearing loss, tinnitus, trouble swallowing and voice change  Eyes: Positive for visual disturbance  Negative for photophobia and pain  Both eyes  Patient reports looks foggy   Respiratory: Negative  Negative for shortness of breath  Cardiovascular: Negative  Negative for palpitations  Gastrointestinal: Negative  Negative for nausea and vomiting  Endocrine: Negative    " Negative for cold intolerance  Genitourinary: Negative  Negative for dysuria, frequency and urgency  Musculoskeletal: Positive for gait problem  Negative for myalgias and neck pain  Patient reports bad knees contribute to trouble walking   Skin: Negative  Negative for rash  Allergic/Immunologic: Negative  Neurological: Negative for dizziness, tremors, seizures, syncope, facial asymmetry, speech difficulty, weakness, light-headedness, numbness and headaches  Hematological: Negative  Does not bruise/bleed easily  Psychiatric/Behavioral: Negative  Negative for confusion, hallucinations and sleep disturbance

## 2023-07-20 DIAGNOSIS — I63.9 STROKE (HCC): ICD-10-CM

## 2023-07-20 DIAGNOSIS — I10 HYPERTENSION, ESSENTIAL: ICD-10-CM

## 2023-07-20 RX ORDER — ATORVASTATIN CALCIUM 80 MG/1
80 TABLET, FILM COATED ORAL EVERY EVENING
Qty: 90 TABLET | Refills: 1 | Status: SHIPPED | OUTPATIENT
Start: 2023-07-20

## 2023-07-20 RX ORDER — AMLODIPINE BESYLATE AND BENAZEPRIL HYDROCHLORIDE 5; 10 MG/1; MG/1
1 CAPSULE ORAL DAILY
Qty: 90 CAPSULE | Refills: 1 | Status: SHIPPED | OUTPATIENT
Start: 2023-07-20

## 2023-08-07 DIAGNOSIS — S76.211D INGUINAL STRAIN, RIGHT, SUBSEQUENT ENCOUNTER: ICD-10-CM

## 2023-08-07 RX ORDER — METHOCARBAMOL 500 MG/1
500 TABLET, FILM COATED ORAL 4 TIMES DAILY PRN
Qty: 120 TABLET | Refills: 0 | OUTPATIENT
Start: 2023-08-07

## 2023-08-22 DIAGNOSIS — S76.211D INGUINAL STRAIN, RIGHT, SUBSEQUENT ENCOUNTER: ICD-10-CM

## 2023-08-29 RX ORDER — METHOCARBAMOL 500 MG/1
500 TABLET, FILM COATED ORAL 4 TIMES DAILY PRN
Qty: 120 TABLET | Refills: 0 | Status: SHIPPED | OUTPATIENT
Start: 2023-08-29

## 2023-10-30 ENCOUNTER — IOP CHECK (OUTPATIENT)
Dept: URBAN - METROPOLITAN AREA CLINIC 6 | Facility: CLINIC | Age: 78
End: 2023-10-30

## 2023-10-30 DIAGNOSIS — H40.033: ICD-10-CM

## 2023-10-30 DIAGNOSIS — H25.813: ICD-10-CM

## 2023-10-30 PROCEDURE — 92012 INTRM OPH EXAM EST PATIENT: CPT

## 2023-10-30 ASSESSMENT — VISUAL ACUITY
OS_SC: 20/40+1
OD_SC: 20/25-2

## 2023-10-30 ASSESSMENT — TONOMETRY
OD_IOP_MMHG: 20
OS_IOP_MMHG: 19

## 2024-01-08 ENCOUNTER — RA CDI HCC (OUTPATIENT)
Dept: OTHER | Facility: HOSPITAL | Age: 79
End: 2024-01-08

## 2024-01-15 ENCOUNTER — OFFICE VISIT (OUTPATIENT)
Dept: FAMILY MEDICINE CLINIC | Facility: CLINIC | Age: 79
End: 2024-01-15
Payer: MEDICARE

## 2024-01-15 VITALS
WEIGHT: 144.8 LBS | BODY MASS INDEX: 26.65 KG/M2 | OXYGEN SATURATION: 98 % | TEMPERATURE: 98 F | HEIGHT: 62 IN | RESPIRATION RATE: 18 BRPM | DIASTOLIC BLOOD PRESSURE: 90 MMHG | SYSTOLIC BLOOD PRESSURE: 160 MMHG | HEART RATE: 81 BPM

## 2024-01-15 DIAGNOSIS — I63.9 STROKE (HCC): ICD-10-CM

## 2024-01-15 DIAGNOSIS — G89.29 CHRONIC PAIN OF RIGHT KNEE: ICD-10-CM

## 2024-01-15 DIAGNOSIS — M25.561 CHRONIC PAIN OF RIGHT KNEE: ICD-10-CM

## 2024-01-15 DIAGNOSIS — N18.30 STAGE 3 CHRONIC KIDNEY DISEASE, UNSPECIFIED WHETHER STAGE 3A OR 3B CKD (HCC): ICD-10-CM

## 2024-01-15 DIAGNOSIS — Z00.00 MEDICARE ANNUAL WELLNESS VISIT, SUBSEQUENT: Primary | ICD-10-CM

## 2024-01-15 DIAGNOSIS — E78.5 HYPERLIPIDEMIA, UNSPECIFIED HYPERLIPIDEMIA TYPE: ICD-10-CM

## 2024-01-15 DIAGNOSIS — R73.9 HYPERGLYCEMIA: ICD-10-CM

## 2024-01-15 DIAGNOSIS — I10 HYPERTENSION, ESSENTIAL: ICD-10-CM

## 2024-01-15 DIAGNOSIS — I63.10 CEREBRAL INFARCTION DUE TO EMBOLISM OF PRECEREBRAL ARTERY (HCC): ICD-10-CM

## 2024-01-15 PROCEDURE — G0439 PPPS, SUBSEQ VISIT: HCPCS | Performed by: FAMILY MEDICINE

## 2024-01-15 PROCEDURE — 99214 OFFICE O/P EST MOD 30 MIN: CPT | Performed by: FAMILY MEDICINE

## 2024-01-15 RX ORDER — ATORVASTATIN CALCIUM 80 MG/1
80 TABLET, FILM COATED ORAL EVERY EVENING
Qty: 90 TABLET | Refills: 3 | Status: SHIPPED | OUTPATIENT
Start: 2024-01-15

## 2024-01-15 RX ORDER — AMLODIPINE BESYLATE AND BENAZEPRIL HYDROCHLORIDE 5; 10 MG/1; MG/1
1 CAPSULE ORAL DAILY
Qty: 90 CAPSULE | Refills: 3 | Status: SHIPPED | OUTPATIENT
Start: 2024-01-15

## 2024-01-15 NOTE — PATIENT INSTRUCTIONS
Medicare Preventive Visit Patient Instructions  Thank you for completing your Welcome to Medicare Visit or Medicare Annual Wellness Visit today. Your next wellness visit will be due in one year (1/15/2025).  The screening/preventive services that you may require over the next 5-10 years are detailed below. Some tests may not apply to you based off risk factors and/or age. Screening tests ordered at today's visit but not completed yet may show as past due. Also, please note that scanned in results may not display below.  Preventive Screenings:  Service Recommendations Previous Testing/Comments   Colorectal Cancer Screening  * Colonoscopy    * Fecal Occult Blood Test (FOBT)/Fecal Immunochemical Test (FIT)  * Fecal DNA/Cologuard Test  * Flexible Sigmoidoscopy Age: 45-75 years old   Colonoscopy: every 10 years (may be performed more frequently if at higher risk)  OR  FOBT/FIT: every 1 year  OR  Cologuard: every 3 years  OR  Sigmoidoscopy: every 5 years  Screening may be recommended earlier than age 45 if at higher risk for colorectal cancer. Also, an individualized decision between you and your healthcare provider will decide whether screening between the ages of 76-85 would be appropriate. Colonoscopy: Not on file  FOBT/FIT: Not on file  Cologuard: Not on file  Sigmoidoscopy: Not on file          Breast Cancer Screening Age: 40+ years old  Frequency: every 1-2 years  Not required if history of left and right mastectomy Mammogram: Not on file        Cervical Cancer Screening Between the ages of 21-29, pap smear recommended once every 3 years.   Between the ages of 30-65, can perform pap smear with HPV co-testing every 5 years.   Recommendations may differ for women with a history of total hysterectomy, cervical cancer, or abnormal pap smears in past. Pap Smear: Not on file    Screening Not Indicated   Hepatitis C Screening Once for adults born between 1945 and 1965  More frequently in patients at high risk for Hepatitis  C Hep C Antibody: 12/09/2021    Screening Current   Diabetes Screening 1-2 times per year if you're at risk for diabetes or have pre-diabetes Fasting glucose: No results in last 5 years (No results in last 5 years)  A1C: 5.7 % of total Hgb (4/22/2022)      Cholesterol Screening Once every 5 years if you don't have a lipid disorder. May order more often based on risk factors. Lipid panel: 04/22/2022    Screening Not Indicated  History Lipid Disorder     Other Preventive Screenings Covered by Medicare:  Abdominal Aortic Aneurysm (AAA) Screening: covered once if your at risk. You're considered to be at risk if you have a family history of AAA.  Lung Cancer Screening: covers low dose CT scan once per year if you meet all of the following conditions: (1) Age 55-77; (2) No signs or symptoms of lung cancer; (3) Current smoker or have quit smoking within the last 15 years; (4) You have a tobacco smoking history of at least 20 pack years (packs per day multiplied by number of years you smoked); (5) You get a written order from a healthcare provider.  Glaucoma Screening: covered annually if you're considered high risk: (1) You have diabetes OR (2) Family history of glaucoma OR (3)  aged 50 and older OR (4)  American aged 65 and older  Osteoporosis Screening: covered every 2 years if you meet one of the following conditions: (1) You're estrogen deficient and at risk for osteoporosis based off medical history and other findings; (2) Have a vertebral abnormality; (3) On glucocorticoid therapy for more than 3 months; (4) Have primary hyperparathyroidism; (5) On osteoporosis medications and need to assess response to drug therapy.   Last bone density test (DXA Scan): Not on file.  HIV Screening: covered annually if you're between the age of 15-65. Also covered annually if you are younger than 15 and older than 65 with risk factors for HIV infection. For pregnant patients, it is covered up to 3 times per  pregnancy.    Immunizations:  Immunization Recommendations   Influenza Vaccine Annual influenza vaccination during flu season is recommended for all persons aged >= 6 months who do not have contraindications   Pneumococcal Vaccine   * Pneumococcal conjugate vaccine = PCV13 (Prevnar 13), PCV15 (Vaxneuvance), PCV20 (Prevnar 20)  * Pneumococcal polysaccharide vaccine = PPSV23 (Pneumovax) Adults 19-65 yo with certain risk factors or if 65+ yo  If never received any pneumonia vaccine: recommend Prevnar 20 (PCV20)  Give PCV20 if previously received 1 dose of PCV13 or PPSV23   Hepatitis B Vaccine 3 dose series if at intermediate or high risk (ex: diabetes, end stage renal disease, liver disease)   Respiratory syncytial virus (RSV) Vaccine - COVERED BY MEDICARE PART D  * RSVPreF3 (Arexvy) CDC recommends that adults 60 years of age and older may receive a single dose of RSV vaccine using shared clinical decision-making (SCDM)   Tetanus (Td) Vaccine - COST NOT COVERED BY MEDICARE PART B Following completion of primary series, a booster dose should be given every 10 years to maintain immunity against tetanus. Td may also be given as tetanus wound prophylaxis.   Tdap Vaccine - COST NOT COVERED BY MEDICARE PART B Recommended at least once for all adults. For pregnant patients, recommended with each pregnancy.   Shingles Vaccine (Shingrix) - COST NOT COVERED BY MEDICARE PART B  2 shot series recommended in those 19 years and older who have or will have weakened immune systems or those 50 years and older     Health Maintenance Due:      Topic Date Due   • DXA SCAN  Never done   • Breast Cancer Screening: Mammogram  Never done   • Hepatitis C Screening  Completed     Immunizations Due:      Topic Date Due   • Pneumococcal Vaccine: 65+ Years (1 - PCV) Never done   • Influenza Vaccine (1) Never done   • COVID-19 Vaccine (3 - 2023-24 season) 09/01/2023     Advance Directives   What are advance directives?  Advance directives are legal  documents that state your wishes and plans for medical care. These plans are made ahead of time in case you lose your ability to make decisions for yourself. Advance directives can apply to any medical decision, such as the treatments you want, and if you want to donate organs.   What are the types of advance directives?  There are many types of advance directives, and each state has rules about how to use them. You may choose a combination of any of the following:  Living will:  This is a written record of the treatment you want. You can also choose which treatments you do not want, which to limit, and which to stop at a certain time. This includes surgery, medicine, IV fluid, and tube feedings.   Durable power of  for healthcare (DPAHC):  This is a written record that states who you want to make healthcare choices for you when you are unable to make them for yourself. This person, called a proxy, is usually a family member or a friend. You may choose more than 1 proxy.  Do not resuscitate (DNR) order:  A DNR order is used in case your heart stops beating or you stop breathing. It is a request not to have certain forms of treatment, such as CPR. A DNR order may be included in other types of advance directives.  Medical directive:  This covers the care that you want if you are in a coma, near death, or unable to make decisions for yourself. You can list the treatments you want for each condition. Treatment may include pain medicine, surgery, blood transfusions, dialysis, IV or tube feedings, and a ventilator (breathing machine).  Values history:  This document has questions about your views, beliefs, and how you feel and think about life. This information can help others choose the care that you would choose.  Why are advance directives important?  An advance directive helps you control your care. Although spoken wishes may be used, it is better to have your wishes written down. Spoken wishes can be  misunderstood, or not followed. Treatments may be given even if you do not want them. An advance directive may make it easier for your family to make difficult choices about your care.   Urinary Incontinence   Urinary incontinence (UI)  is when you lose control of your bladder. UI develops because your bladder cannot store or empty urine properly. The 3 most common types of UI are stress incontinence, urge incontinence, or both.  Medicines:   May be given to help strengthen your bladder control. Report any side effects of medication to your healthcare provider.  Do pelvic muscle exercises often:  Your pelvic muscles help you stop urinating. Squeeze these muscles tight for 5 seconds, then relax for 5 seconds. Gradually work up to squeezing for 10 seconds. Do 3 sets of 15 repetitions a day, or as directed. This will help strengthen your pelvic muscles and improve bladder control.  Train your bladder:  Go to the bathroom at set times, such as every 2 hours, even if you do not feel the urge to go. You can also try to hold your urine when you feel the urge to go. For example, hold your urine for 5 minutes when you feel the urge to go. As that becomes easier, hold your urine for 10 minutes.   Self-care:   Keep a UI record.  Write down how often you leak urine and how much you leak. Make a note of what you were doing when you leaked urine.  Drink liquids as directed. You may need to limit the amount of liquid you drink to help control your urine leakage. Do not drink any liquid right before you go to bed. Limit or do not have drinks that contain caffeine or alcohol.   Prevent constipation.  Eat a variety of high-fiber foods. Good examples are high-fiber cereals, beans, vegetables, and whole-grain breads. Walking is the best way to trigger your intestines to have a bowel movement.  Exercise regularly and maintain a healthy weight.  Weight loss and exercise will decrease pressure on your bladder and help you control your  leakage.   Use a catheter as directed  to help empty your bladder. A catheter is a tiny, plastic tube that is put into your bladder to drain your urine.   Go to behavior therapy as directed.  Behavior therapy may be used to help you learn to control your urge to urinate.    Cigarette Smoking and Your Health   Risks to your health if you smoke:  Nicotine and other chemicals found in tobacco damage every cell in your body. Even if you are a light smoker, you have an increased risk for cancer, heart disease, and lung disease. If you are pregnant or have diabetes, smoking increases your risk for complications.   Benefits to your health if you stop smoking:   You decrease respiratory symptoms such as coughing, wheezing, and shortness of breath.   You reduce your risk for cancers of the lung, mouth, throat, kidney, bladder, pancreas, stomach, and cervix. If you already have cancer, you increase the benefits of chemotherapy. You also reduce your risk for cancer returning or a second cancer from developing.   You reduce your risk for heart disease, blood clots, heart attack, and stroke.   You reduce your risk for lung infections, and diseases such as pneumonia, asthma, chronic bronchitis, and emphysema.  Your circulation improves. More oxygen can be delivered to your body. If you have diabetes, you lower your risk for complications, such as kidney, artery, and eye diseases. You also lower your risk for nerve damage. Nerve damage can lead to amputations, poor vision, and blindness.  You improve your body's ability to heal and to fight infections.  For more information and support to stop smoking:   [place] [employer].gov  Phone: 1- 376 - 129-4272  Web Address: www.[employer]  Weight Management   Why it is important to manage your weight:  Being overweight increases your risk of health conditions such as heart disease, high blood pressure, type 2 diabetes, and certain types of cancer. It can also increase your risk for  osteoarthritis, sleep apnea, and other respiratory problems. Aim for a slow, steady weight loss. Even a small amount of weight loss can lower your risk of health problems.  How to lose weight safely:  A safe and healthy way to lose weight is to eat fewer calories and get regular exercise. You can lose up about 1 pound a week by decreasing the number of calories you eat by 500 calories each day.   Healthy meal plan for weight management:  A healthy meal plan includes a variety of foods, contains fewer calories, and helps you stay healthy. A healthy meal plan includes the following:  Eat whole-grain foods more often.  A healthy meal plan should contain fiber. Fiber is the part of grains, fruits, and vegetables that is not broken down by your body. Whole-grain foods are healthy and provide extra fiber in your diet. Some examples of whole-grain foods are whole-wheat breads and pastas, oatmeal, brown rice, and bulgur.  Eat a variety of vegetables every day.  Include dark, leafy greens such as spinach, kale, christina greens, and mustard greens. Eat yellow and orange vegetables such as carrots, sweet potatoes, and winter squash.   Eat a variety of fruits every day.  Choose fresh or canned fruit (canned in its own juice or light syrup) instead of juice. Fruit juice has very little or no fiber.  Eat low-fat dairy foods.  Drink fat-free (skim) milk or 1% milk. Eat fat-free yogurt and low-fat cottage cheese. Try low-fat cheeses such as mozzarella and other reduced-fat cheeses.  Choose meat and other protein foods that are low in fat.  Choose beans or other legumes such as split peas or lentils. Choose fish, skinless poultry (chicken or turkey), or lean cuts of red meat (beef or pork). Before you cook meat or poultry, cut off any visible fat.   Use less fat and oil.  Try baking foods instead of frying them. Add less fat, such as margarine, sour cream, regular salad dressing and mayonnaise to foods. Eat fewer high-fat foods. Some  examples of high-fat foods include french fries, doughnuts, ice cream, and cakes.  Eat fewer sweets.  Limit foods and drinks that are high in sugar. This includes candy, cookies, regular soda, and sweetened drinks.  Exercise:  Exercise at least 30 minutes per day on most days of the week. Some examples of exercise include walking, biking, dancing, and swimming. You can also fit in more physical activity by taking the stairs instead of the elevator or parking farther away from stores. Ask your healthcare provider about the best exercise plan for you.      © Copyright [employer] 2018 Information is for End User's use only and may not be sold, redistributed or otherwise used for commercial purposes. All illustrations and images included in CareNotes® are the copyrighted property of [employer] Inc[employer] or [employer]

## 2024-01-15 NOTE — ASSESSMENT & PLAN NOTE
Cerebral infarction.  Unfortunately patient continues to smoke 1/2 pack of cigarettes per day.  She does take her dose of Eliquis 5 mg twice daily.  She does not report any new neurologic symptoms

## 2024-01-15 NOTE — ASSESSMENT & PLAN NOTE
Lab Results   Component Value Date    EGFR 39 12/11/2021    EGFR 58 12/07/2021    CREATININE 0.88 04/22/2022    CREATININE 1.34 (H) 12/11/2021    CREATININE 0.96 12/07/2021   Chronic kidney disease.  Patient has been noncompliant and obtaining blood work.  I stressed the importance of checking kidney function at this time.

## 2024-01-15 NOTE — PROGRESS NOTES
Assessment and Plan:     Problem List Items Addressed This Visit       Hyperlipidemia     Hyperlipidemia.  Patient was given prescription to obtain lipid panel blood work.  She will continue with current dose of statin therapy         Relevant Medications    atorvastatin (LIPITOR) 80 mg tablet    Other Relevant Orders    CBC    Comprehensive metabolic panel    Lipid panel    TSH, 3rd generation    Hemoglobin A1C    Hypertension, essential     Hypertension.  The patient's blood pressure is stable at this time and he will continue current regimen of medications.  Patient states home blood pressure readings are usually less than 120 over 80s.  She again was given prescription to obtain blood work as ordered.  I stressed the importance of having routine monitoring for kidney and liver function         Relevant Medications    amLODIPine-benazepril (LOTREL 5-10) 5-10 MG per capsule    Other Relevant Orders    CBC    Comprehensive metabolic panel    Lipid panel    TSH, 3rd generation    Hemoglobin A1C    Chronic pain of right knee    Stage 3 chronic kidney disease, unspecified whether stage 3a or 3b CKD (HCC)     Lab Results   Component Value Date    EGFR 39 12/11/2021    EGFR 58 12/07/2021    CREATININE 0.88 04/22/2022    CREATININE 1.34 (H) 12/11/2021    CREATININE 0.96 12/07/2021   Chronic kidney disease.  Patient has been noncompliant and obtaining blood work.  I stressed the importance of checking kidney function at this time.         Relevant Orders    CBC    Comprehensive metabolic panel    Lipid panel    TSH, 3rd generation    Hemoglobin A1C    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(HCC)     Cerebral infarction.  Unfortunately patient continues to smoke 1/2 pack of cigarettes per day.  She does take her dose of Eliquis 5 mg twice daily.  She does not report any new neurologic symptoms          Other Visit Diagnoses       Medicare annual wellness visit, subsequent    -  Primary    Hyperglycemia        Relevant Orders    CBC     Comprehensive metabolic panel    Lipid panel    TSH, 3rd generation    Hemoglobin A1C    Stroke (HCC)        Relevant Medications    atorvastatin (LIPITOR) 80 mg tablet    apixaban (Eliquis) 5 mg             Preventive health issues were discussed with patient, and age appropriate screening tests were ordered as noted in patient's After Visit Summary.  Personalized health advice and appropriate referrals for health education or preventive services given if needed, as noted in patient's After Visit Summary.     History of Present Illness:     Patient presents for a Medicare Wellness Visit    Patient in the office to review chronic medical conditions.  She continues to complain of bilateral knee pains right greater than left.  At rest patient has minimal to no discomfort however with ambulating she again experiences discomfort.  She has been reluctant to have trial of steroid or gel injections.  Patient is open to the idea of knee replacement but not feel orthopedics is ready to perform this procedure without trying other alternatives.  Patient continues to smoke 1/2 pack of cigarettes per day.  Patient has been noncompliant with obtaining blood work as ordered previously.  She denies any recent illness.       Patient Care Team:  Naveen Oliveros MD as PCP - General (Family Medicine)     Review of Systems:     Review of Systems   Constitutional: Negative.    HENT: Negative.     Eyes: Negative.    Respiratory: Negative.     Cardiovascular: Negative.    Gastrointestinal: Negative.    Genitourinary: Negative.    Musculoskeletal:  Positive for arthralgias and gait problem.   Skin: Negative.    Psychiatric/Behavioral: Negative.          Problem List:     Patient Active Problem List   Diagnosis    Hyperlipidemia    Hypertension, essential    Prediabetes    Hip pain, chronic, right    Chronic pain of right knee    Stage 3 chronic kidney disease, unspecified whether stage 3a or 3b CKD (HCC)    Visual disturbance    Embolic  cerebral infarction (HCC)    Sprain of left wrist    Tobacco use disorder      Past Medical and Surgical History:     Past Medical History:   Diagnosis Date    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(HCC) 12/11/2021    Multiple embolic strokes on MRI 12/11/2021    Hyperlipidemia 3/9/2017    Continue with healthy diet exercise program when home renovations have been completed.    Hypertension     Prediabetes 3/9/2017    Stage 3 chronic kidney disease, unspecified whether stage 3a or 3b CKD (HCC) 12/9/2021    Tobacco use disorder 4/13/2023     History reviewed. No pertinent surgical history.   Family History:     Family History   Problem Relation Age of Onset    Heart disease Mother     Heart disease Father     Crohn's disease Son     Mental illness Son     No Known Problems Daughter     No Known Problems Daughter     No Known Problems Son     No Known Problems Son     No Known Problems Son       Social History:     Social History     Socioeconomic History    Marital status: Single     Spouse name: None    Number of children: None    Years of education: None    Highest education level: None   Occupational History    None   Tobacco Use    Smoking status: Every Day     Current packs/day: 0.50     Average packs/day: 0.5 packs/day for 68.0 years (34.0 ttl pk-yrs)     Types: Cigarettes    Smokeless tobacco: Never   Vaping Use    Vaping status: Never Used   Substance and Sexual Activity    Alcohol use: Not Currently     Comment: rare    Drug use: No    Sexual activity: Not Currently   Other Topics Concern    None   Social History Narrative    Lives alone.   since about 2002.    6 children.  14 grandchildren.    Retired.  Was a .    Enjoys working out in the yard.     Social Determinants of Health     Financial Resource Strain: Low Risk  (1/15/2024)    Overall Financial Resource Strain (CARDIA)     Difficulty of Paying Living Expenses: Not hard at all   Food Insecurity: Not on file   Transportation Needs: No  Transportation Needs (1/15/2024)    PRAPARE - Transportation     Lack of Transportation (Medical): No     Lack of Transportation (Non-Medical): No   Physical Activity: Not on file   Stress: Not on file   Social Connections: Not on file   Intimate Partner Violence: Not on file   Housing Stability: Not on file      Medications and Allergies:     Current Outpatient Medications   Medication Sig Dispense Refill    amLODIPine-benazepril (LOTREL 5-10) 5-10 MG per capsule Take 1 capsule by mouth daily 90 capsule 3    apixaban (Eliquis) 5 mg Take 1 tablet (5 mg total) by mouth 2 (two) times a day 180 tablet 3    aspirin (ECOTRIN LOW STRENGTH) 81 mg EC tablet Take 1 tablet (81 mg total) by mouth daily 90 tablet 1    atorvastatin (LIPITOR) 80 mg tablet Take 1 tablet (80 mg total) by mouth every evening 90 tablet 3    methocarbamol (ROBAXIN) 500 mg tablet Take 1 tablet (500 mg total) by mouth 4 (four) times a day as needed for muscle spasms 120 tablet 0     No current facility-administered medications for this visit.     No Known Allergies   Immunizations:     Immunization History   Administered Date(s) Administered    COVID-19 PFIZER VACCINE 0.3 ML IM 04/13/2021, 05/06/2021      Health Maintenance:         Topic Date Due    DXA SCAN  Never done    Breast Cancer Screening: Mammogram  Never done    Lung Cancer Screening  Never done    Hepatitis C Screening  Completed         Topic Date Due    Pneumococcal Vaccine: 65+ Years (1 - PCV) Never done    COVID-19 Vaccine (3 - 2023-24 season) 09/01/2023      Medicare Screening Tests and Risk Assessments:     Margarita is here for her Subsequent Wellness visit.     Health Risk Assessment:   Patient rates overall health as good. Patient feels that their physical health rating is same. Patient is satisfied with their life. Eyesight was rated as same. Hearing was rated as same. Patient feels that their emotional and mental health rating is same. Patients states they are never, rarely  angry. Patient states they are sometimes unusually tired/fatigued. Pain experienced in the last 7 days has been some. Patient's pain rating has been 8/10. Patient states that she has experienced no weight loss or gain in last 6 months.     Depression Screening:   PHQ-2 Score: 0      Fall Risk Screening:   In the past year, patient has experienced: no history of falling in past year      Urinary Incontinence Screening:   Patient has leaked urine accidently in the last six months.     Home Safety:  Patient has trouble with stairs inside or outside of their home. Patient has working smoke alarms and has working carbon monoxide detector. Home safety hazards include: none.     Nutrition:   Current diet is Regular.     Medications:   Patient is currently taking over-the-counter supplements. OTC medications include: see medication list. Patient is able to manage medications.     Activities of Daily Living (ADLs)/Instrumental Activities of Daily Living (IADLs):   Walk and transfer into and out of bed and chair?: Yes  Dress and groom yourself?: Yes    Bathe or shower yourself?: Yes    Feed yourself? Yes  Do your laundry/housekeeping?: Yes  Manage your money, pay your bills and track your expenses?: Yes  Make your own meals?: Yes    Do your own shopping?: Yes    Previous Hospitalizations:   Any hospitalizations or ED visits within the last 12 months?: No      Advance Care Planning:   Living will: Yes    Durable POA for healthcare: Yes    Advanced directive: Yes      PREVENTIVE SCREENINGS      Cardiovascular Screening:    General: Screening Not Indicated, History Lipid Disorder and Risks and Benefits Discussed    Due for: Lipid Panel      Diabetes Screening:     General: Risks and Benefits Discussed    Due for: Blood Glucose      Colorectal Cancer Screening:     General: Screening Not Indicated      Breast Cancer Screening:     General: Screening Not Indicated      Cervical Cancer Screening:    General: Screening Not  "Indicated      Hepatitis C Screening:    General: Screening Current    Screening, Brief Intervention, and Referral to Treatment (SBIRT)    Screening  Typical number of drinks in a day: 0  Typical number of drinks in a week: 0  Interpretation: Low risk drinking behavior.    Single Item Drug Screening:  How often have you used an illegal drug (including marijuana) or a prescription medication for non-medical reasons in the past year? never    Single Item Drug Screen Score: 0  Interpretation: Negative screen for possible drug use disorder    No results found.     Physical Exam:     /90   Pulse 81   Temp 98 °F (36.7 °C) (Temporal)   Resp 18   Ht 5' 2\" (1.575 m)   Wt 65.7 kg (144 lb 12.8 oz)   SpO2 98%   BMI 26.48 kg/m²     Physical Exam  Vitals and nursing note reviewed.   Constitutional:       General: She is not in acute distress.     Appearance: Normal appearance. She is well-developed. She is not ill-appearing.   HENT:      Head: Normocephalic and atraumatic.   Eyes:      General:         Right eye: No discharge.         Left eye: No discharge.      Extraocular Movements: Extraocular movements intact.      Conjunctiva/sclera: Conjunctivae normal.      Pupils: Pupils are equal, round, and reactive to light.   Neck:      Vascular: No carotid bruit.   Cardiovascular:      Rate and Rhythm: Normal rate and regular rhythm.      Heart sounds: No murmur heard.  Pulmonary:      Effort: Pulmonary effort is normal. No respiratory distress.      Breath sounds: Normal breath sounds.   Musculoskeletal:      Right lower leg: No edema.      Left lower leg: No edema.   Lymphadenopathy:      Cervical: No cervical adenopathy.   Skin:     General: Skin is warm and dry.      Capillary Refill: Capillary refill takes less than 2 seconds.   Neurological:      Mental Status: She is alert. Mental status is at baseline.   Psychiatric:         Mood and Affect: Mood normal.         Behavior: Behavior normal.         Thought " Content: Thought content normal.         Judgment: Judgment normal.     I spent 30 minutes with this patient of which greater than 50% was spent counseling or reviewing chart    Naveen Oliveros MD

## 2024-01-15 NOTE — ASSESSMENT & PLAN NOTE
Hypertension.  The patient's blood pressure is stable at this time and he will continue current regimen of medications.  Patient states home blood pressure readings are usually less than 120 over 80s.  She again was given prescription to obtain blood work as ordered.  I stressed the importance of having routine monitoring for kidney and liver function

## 2024-01-15 NOTE — ASSESSMENT & PLAN NOTE
Hyperlipidemia.  Patient was given prescription to obtain lipid panel blood work.  She will continue with current dose of statin therapy

## 2024-02-01 ENCOUNTER — TELEPHONE (OUTPATIENT)
Dept: FAMILY MEDICINE CLINIC | Facility: CLINIC | Age: 79
End: 2024-02-01

## 2024-02-01 DIAGNOSIS — G89.29 CHRONIC PAIN OF RIGHT KNEE: Primary | ICD-10-CM

## 2024-02-01 DIAGNOSIS — M25.561 CHRONIC PAIN OF RIGHT KNEE: Primary | ICD-10-CM

## 2024-02-01 RX ORDER — TRAMADOL HYDROCHLORIDE 50 MG/1
50 TABLET ORAL EVERY 8 HOURS PRN
Qty: 30 TABLET | Refills: 0 | Status: SHIPPED | OUTPATIENT
Start: 2024-02-01

## 2024-05-02 ENCOUNTER — IOP CHECK (OUTPATIENT)
Dept: URBAN - METROPOLITAN AREA CLINIC 6 | Facility: CLINIC | Age: 79
End: 2024-05-02

## 2024-05-02 DIAGNOSIS — H35.3120: ICD-10-CM

## 2024-05-02 DIAGNOSIS — H40.033: ICD-10-CM

## 2024-05-02 DIAGNOSIS — H25.813: ICD-10-CM

## 2024-05-02 PROCEDURE — 92133 CPTRZD OPH DX IMG PST SGM ON: CPT

## 2024-05-02 PROCEDURE — 92020 GONIOSCOPY: CPT

## 2024-05-02 PROCEDURE — 92014 COMPRE OPH EXAM EST PT 1/>: CPT

## 2024-05-02 PROCEDURE — 92202 OPSCPY EXTND ON/MAC DRAW: CPT

## 2024-05-02 ASSESSMENT — TONOMETRY
OS_IOP_MMHG: 19
OD_IOP_MMHG: 19

## 2024-05-02 ASSESSMENT — VISUAL ACUITY
OD_SC: 20/30-2
OS_SC: 20/40+1

## 2024-07-25 ENCOUNTER — RA CDI HCC (OUTPATIENT)
Dept: OTHER | Facility: HOSPITAL | Age: 79
End: 2024-07-25

## 2024-07-25 DIAGNOSIS — S76.211D INGUINAL STRAIN, RIGHT, SUBSEQUENT ENCOUNTER: ICD-10-CM

## 2024-07-25 PROBLEM — Z86.73 HISTORY OF CEREBRAL EMBOLIC INFARCTION: Status: ACTIVE | Noted: 2021-12-11

## 2024-07-25 RX ORDER — METHOCARBAMOL 500 MG/1
500 TABLET, FILM COATED ORAL 4 TIMES DAILY PRN
Qty: 120 TABLET | Refills: 0 | Status: SHIPPED | OUTPATIENT
Start: 2024-07-25 | End: 2024-08-01 | Stop reason: SDUPTHER

## 2024-07-25 NOTE — TELEPHONE ENCOUNTER
Reason for call:   [x] Refill   [] Prior Auth  [] Other:     Office:   [x] PCP/Provider - [place] / [facility]    Medication: methocarbamol    Dose/Frequency: 500mg qid prn    Quantity: 120    Pharmacy: [facility]  [id number]     Does the patient have enough for 3 days?   [] Yes   [x] No - Send as HP to POD

## 2024-08-01 ENCOUNTER — APPOINTMENT (OUTPATIENT)
Dept: LAB | Facility: CLINIC | Age: 79
End: 2024-08-01
Payer: MEDICARE

## 2024-08-01 ENCOUNTER — OFFICE VISIT (OUTPATIENT)
Dept: FAMILY MEDICINE CLINIC | Facility: CLINIC | Age: 79
End: 2024-08-01
Payer: MEDICARE

## 2024-08-01 VITALS
WEIGHT: 133.4 LBS | HEART RATE: 74 BPM | SYSTOLIC BLOOD PRESSURE: 136 MMHG | TEMPERATURE: 97.8 F | HEIGHT: 62 IN | RESPIRATION RATE: 18 BRPM | BODY MASS INDEX: 24.55 KG/M2 | OXYGEN SATURATION: 98 % | DIASTOLIC BLOOD PRESSURE: 80 MMHG

## 2024-08-01 DIAGNOSIS — N18.30 STAGE 3 CHRONIC KIDNEY DISEASE, UNSPECIFIED WHETHER STAGE 3A OR 3B CKD (HCC): ICD-10-CM

## 2024-08-01 DIAGNOSIS — Z12.31 ENCOUNTER FOR SCREENING MAMMOGRAM FOR BREAST CANCER: Primary | ICD-10-CM

## 2024-08-01 DIAGNOSIS — G89.29 CHRONIC PAIN OF RIGHT KNEE: ICD-10-CM

## 2024-08-01 DIAGNOSIS — I10 HYPERTENSION, ESSENTIAL: ICD-10-CM

## 2024-08-01 DIAGNOSIS — S76.211D INGUINAL STRAIN, RIGHT, SUBSEQUENT ENCOUNTER: ICD-10-CM

## 2024-08-01 DIAGNOSIS — M25.561 CHRONIC PAIN OF RIGHT KNEE: ICD-10-CM

## 2024-08-01 DIAGNOSIS — E78.5 HYPERLIPIDEMIA, UNSPECIFIED HYPERLIPIDEMIA TYPE: ICD-10-CM

## 2024-08-01 DIAGNOSIS — R73.9 HYPERGLYCEMIA: ICD-10-CM

## 2024-08-01 DIAGNOSIS — Z13.820 SCREENING FOR OSTEOPOROSIS: ICD-10-CM

## 2024-08-01 LAB
ALBUMIN SERPL BCG-MCNC: 4.1 G/DL (ref 3.5–5)
ALP SERPL-CCNC: 122 U/L (ref 34–104)
ALT SERPL W P-5'-P-CCNC: 20 U/L (ref 7–52)
ANION GAP SERPL CALCULATED.3IONS-SCNC: 9 MMOL/L (ref 4–13)
AST SERPL W P-5'-P-CCNC: 24 U/L (ref 13–39)
BILIRUB SERPL-MCNC: 0.44 MG/DL (ref 0.2–1)
BUN SERPL-MCNC: 12 MG/DL (ref 5–25)
CALCIUM SERPL-MCNC: 10.1 MG/DL (ref 8.4–10.2)
CHLORIDE SERPL-SCNC: 102 MMOL/L (ref 96–108)
CHOLEST SERPL-MCNC: 137 MG/DL
CO2 SERPL-SCNC: 25 MMOL/L (ref 21–32)
CREAT SERPL-MCNC: 0.89 MG/DL (ref 0.6–1.3)
ERYTHROCYTE [DISTWIDTH] IN BLOOD BY AUTOMATED COUNT: 13.1 % (ref 11.6–15.1)
EST. AVERAGE GLUCOSE BLD GHB EST-MCNC: 134 MG/DL
GFR SERPL CREATININE-BSD FRML MDRD: 62 ML/MIN/1.73SQ M
GLUCOSE P FAST SERPL-MCNC: 105 MG/DL (ref 65–99)
HBA1C MFR BLD: 6.3 %
HCT VFR BLD AUTO: 43.1 % (ref 34.8–46.1)
HDLC SERPL-MCNC: 37 MG/DL
HGB BLD-MCNC: 14.2 G/DL (ref 11.5–15.4)
LDLC SERPL CALC-MCNC: 75 MG/DL (ref 0–100)
MCH RBC QN AUTO: 31.1 PG (ref 26.8–34.3)
MCHC RBC AUTO-ENTMCNC: 32.9 G/DL (ref 31.4–37.4)
MCV RBC AUTO: 95 FL (ref 82–98)
NONHDLC SERPL-MCNC: 100 MG/DL
PLATELET # BLD AUTO: 379 THOUSANDS/UL (ref 149–390)
PMV BLD AUTO: 9.3 FL (ref 8.9–12.7)
POTASSIUM SERPL-SCNC: 5 MMOL/L (ref 3.5–5.3)
PROT SERPL-MCNC: 8.3 G/DL (ref 6.4–8.4)
RBC # BLD AUTO: 4.56 MILLION/UL (ref 3.81–5.12)
SODIUM SERPL-SCNC: 136 MMOL/L (ref 135–147)
TRIGL SERPL-MCNC: 123 MG/DL
TSH SERPL DL<=0.05 MIU/L-ACNC: 7.47 UIU/ML (ref 0.45–4.5)
WBC # BLD AUTO: 9.57 THOUSAND/UL (ref 4.31–10.16)

## 2024-08-01 PROCEDURE — 80061 LIPID PANEL: CPT

## 2024-08-01 PROCEDURE — 85027 COMPLETE CBC AUTOMATED: CPT

## 2024-08-01 PROCEDURE — 84443 ASSAY THYROID STIM HORMONE: CPT

## 2024-08-01 PROCEDURE — G2211 COMPLEX E/M VISIT ADD ON: HCPCS | Performed by: FAMILY MEDICINE

## 2024-08-01 PROCEDURE — 99214 OFFICE O/P EST MOD 30 MIN: CPT | Performed by: FAMILY MEDICINE

## 2024-08-01 PROCEDURE — 80053 COMPREHEN METABOLIC PANEL: CPT

## 2024-08-01 PROCEDURE — 36415 COLL VENOUS BLD VENIPUNCTURE: CPT

## 2024-08-01 PROCEDURE — 83036 HEMOGLOBIN GLYCOSYLATED A1C: CPT

## 2024-08-01 RX ORDER — METHOCARBAMOL 500 MG/1
500 TABLET, FILM COATED ORAL 3 TIMES DAILY PRN
Qty: 40 TABLET | Refills: 0 | Status: SHIPPED | OUTPATIENT
Start: 2024-08-01

## 2024-08-01 RX ORDER — IBUPROFEN 800 MG/1
TABLET ORAL
Qty: 60 TABLET | Refills: 0 | Status: SHIPPED | OUTPATIENT
Start: 2024-08-01

## 2024-08-01 RX ORDER — TRAMADOL HYDROCHLORIDE 50 MG/1
50 TABLET ORAL EVERY 8 HOURS PRN
Qty: 30 TABLET | Refills: 0 | Status: SHIPPED | OUTPATIENT
Start: 2024-08-01

## 2024-08-01 NOTE — ASSESSMENT & PLAN NOTE
Arthralgia.  Patient was given a refill on her tramadol and methocarbamol.  She requested refill of ibuprofen 800 mg but is aware to only use this for very short periods of time due to being on Eliquis and possible side effect of internal bleeding.

## 2024-08-01 NOTE — PROGRESS NOTES
FAMILY PRACTICE OFFICE VISIT       NAME: Margarita Ramirez  AGE: 79 y.o. SEX: female       : 1945        MRN: [id number]    DATE: 2024  TIME: 12:44 PM    Assessment and Plan     Problem List Items Addressed This Visit       Hyperlipidemia     Hyperlipidemia.  Patient will obtain lipid panel blood work and continue with current dose of statin therapy         Hypertension, essential     Hypertension.  The patient's blood pressure is stable at this time and he will continue current regimen of medications         Chronic pain of right knee     Arthralgia.  Patient was given a refill on her tramadol and methocarbamol.  She requested refill of ibuprofen 800 mg but is aware to only use this for very short periods of time due to being on Eliquis and possible side effect of internal bleeding.         Relevant Medications    ibuprofen (MOTRIN) 800 mg tablet    traMADol (Ultram) 50 mg tablet     Other Visit Diagnoses       Encounter for screening mammogram for breast cancer    -  Primary    Screening for osteoporosis        Inguinal strain, right, subsequent encounter        Relevant Medications    methocarbamol (ROBAXIN) 500 mg tablet                Chief Complaint     Chief Complaint   Patient presents with    Follow-up     8 months        History of Present Illness     Patient in the office to review chronic conditions.  She continues to use combination of either ibuprofen 800 mg or tramadol very sparingly for chronic knee and hip pains related to degenerative joint disease.  She denies any recent illness.  She continues to smoke 1/2 pack of cigarettes per day.  Unfortunately she did not obtain blood work previously ordered on last office visit.        Review of Systems   Review of Systems   Constitutional: Negative.    HENT: Negative.     Eyes: Negative.    Respiratory: Negative.     Cardiovascular: Negative.    Gastrointestinal: Negative.    Genitourinary: Negative.    Musculoskeletal:  Positive for  arthralgias.   Skin: Negative.    Neurological: Negative.    Psychiatric/Behavioral: Negative.         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    Hypertension     Prediabetes 3/9/2017    Stage 3 chronic kidney disease, unspecified whether stage 3a or 3b CKD (HCC) 12/9/2021    Tobacco use disorder 4/13/2023       Past Surgical History:  History reviewed. No pertinent surgical history.    Family History:  Family History   Problem Relation Age of Onset    Heart disease Mother     Heart disease Father     Crohn's disease Son     Mental illness Son     No Known Problems Daughter     No Known Problems Daughter     No Known Problems Son     No Known Problems Son     No Known Problems Son        Social History:  Social History     Socioeconomic History    Marital status: Single     Spouse name: Not on file    Number of children: Not on file    Years of education: Not on file    Highest education level: Not on file   Occupational History    Not on file   Tobacco Use    Smoking status: Every Day     Current packs/day: 0.50     Average packs/day: 0.5 packs/day for 68.0 years (34.0 ttl pk-yrs)     Types: Cigarettes    Smokeless tobacco: Never   Vaping Use    Vaping status: Never Used   Substance and Sexual Activity    Alcohol use: Not Currently     Comment: rare    Drug use: No    Sexual activity: Not Currently   Other Topics  Concern    Not on file   Social History Narrative    Lives alone.   since about 2002.    6 children.  14 grandchildren.    Retired.  Was a .    Enjoys working out in the yard.     Social Determinants of Health     Financial Resource Strain: Low Risk  (1/15/2024)    Overall Financial Resource Strain (CARDIA)     Difficulty of Paying Living Expenses: Not hard at all   Food Insecurity: Not on file   Transportation Needs: No Transportation Needs (1/15/2024)    PRAPARE - Transportation     Lack of Transportation (Medical): No     Lack of Transportation (Non-Medical): No   Physical Activity: Not on file   Stress: Not on file   Social Connections: Not on file   Intimate Partner Violence: Not on file   Housing Stability: Not on file       Objective     Vitals:    08/01/24 0937   BP: 136/80   Pulse: 74   Resp: 18   Temp: 97.8 °F (36.6 °C)   SpO2: 98%     Wt Readings from Last 3 Encounters:   08/01/24 60.5 kg (133 lb 6.4 oz)   01/15/24 65.7 kg (144 lb 12.8 oz)   06/01/23 62.6 kg (138 lb)       Physical Exam  Constitutional:       General: She is not in acute distress.     Appearance: Normal appearance. She is not ill-appearing.   HENT:      Head: Normocephalic and atraumatic.   Eyes:      General:         Right eye: No discharge.         Left eye: No discharge.      Conjunctiva/sclera: Conjunctivae normal.      Pupils: Pupils are equal, round, and reactive to light.   Neck:      Vascular: No carotid bruit.   Cardiovascular:      Rate and Rhythm: Normal rate and regular rhythm.      Heart sounds: Normal heart sounds. No murmur heard.  Pulmonary:      Effort: Pulmonary effort is normal.      Breath sounds: Normal breath sounds. No wheezing, rhonchi or rales.   Abdominal:      General: Abdomen is flat. Bowel sounds are normal. There is no distension.      Palpations: Abdomen is soft.      Tenderness: There is no abdominal tenderness. There is no guarding or rebound.   Musculoskeletal:      Right lower leg: No  edema.      Left lower leg: No edema.   Lymphadenopathy:      Cervical: No cervical adenopathy.   Skin:     Findings: No rash.   Neurological:      General: No focal deficit present.      Mental Status: She is alert and oriented to person, place, and time.      Cranial Nerves: No cranial nerve deficit.   Psychiatric:         Mood and Affect: Mood normal.         Behavior: Behavior normal.         Thought Content: Thought content normal.         Judgment: Judgment normal.         Pertinent Laboratory/Diagnostic Studies:  Lab Results   Component Value Date    BUN 16 04/22/2022    CREATININE 0.88 04/22/2022    CALCIUM 10.0 04/22/2022     (L) 02/17/2016    K 3.5 04/22/2022    CO2 24 04/22/2022     04/22/2022     Lab Results   Component Value Date    ALT 22 04/22/2022    AST 22 04/22/2022    ALKPHOS 151 04/22/2022    BILITOT 0.4 02/17/2016       Lab Results   Component Value Date    WBC 7.2 04/22/2022    HGB 13.0 04/22/2022    HCT 38.2 04/22/2022    MCV 89.7 04/22/2022     04/22/2022       Lab Results   Component Value Date    TSH 4.99 (H) 08/06/2020       Lab Results   Component Value Date    CHOL 270 (H) 02/17/2016     Lab Results   Component Value Date    TRIG 133 04/22/2022     Lab Results   Component Value Date    HDL 39 (L) 04/22/2022     Lab Results   Component Value Date    LDLCALC 96 04/22/2022     Lab Results   Component Value Date    HGBA1C 5.7 (H) 04/22/2022       Results for orders placed or performed during the hospital encounter of 04/11/23   Wheeled Walker   Result Value Ref Range    Supplier Name [employer]     Supplier Phone Number [phone]     Order Status Completed     Delivery Note      Delivery Request Date 04/11/2023     Item Description Wheeled Walker, Adult    Commode   Result Value Ref Range    Supplier Name [employer] [facility] [employer]     Supplier Phone Number [phone]     Order Status Completed     Delivery Note      Delivery  Request Date 04/11/2023     Item Description 3 in 1 [employer]mode        No orders of the defined types were placed in this encounter.      ALLERGIES:  No Known Allergies    Current Medications     Current Outpatient Medications   Medication Sig Dispense Refill    ibuprofen (MOTRIN) 800 mg tablet One po bid prn 60 tablet 0    methocarbamol (ROBAXIN) 500 mg tablet Take 1 tablet (500 mg total) by mouth 3 (three) times a day as needed for muscle spasms 40 tablet 0    traMADol (Ultram) 50 mg tablet Take 1 tablet (50 mg total) by mouth every 8 (eight) hours as needed for moderate pain 30 tablet 0    amLODIPine-benazepril (LOTREL 5-10) 5-10 MG per capsule Take 1 capsule by mouth daily 90 capsule 3    apixaban (Eliquis) 5 mg Take 1 tablet (5 mg total) by mouth 2 (two) times a day 180 tablet 3    aspirin (ECOTRIN LOW STRENGTH) 81 mg EC tablet Take 1 tablet (81 mg total) by mouth daily 90 tablet 1    atorvastatin (LIPITOR) 80 mg tablet Take 1 tablet (80 mg total) by mouth every evening 90 tablet 3     No current facility-administered medications for this visit.         Health Maintenance     Health Maintenance   Topic Date Due    DXA SCAN  Never done    Pneumococcal Vaccine: 65+ Years (1 of 2 - PCV) Never done    Breast Cancer Screening: Mammogram  Never done    Osteoporosis Screening  Never done    Lung Cancer Screening  Never done    Zoster Vaccine (1 of 2) Never done    COVID-19 Vaccine (3 - 2023-24 season) 09/01/2023    Influenza Vaccine (1) 09/01/2024    RSV Vaccine Age 60+ Years (1 - 1-dose 60+ series) 12/13/2024 (Originally 1/9/2007)    Fall Risk  01/15/2025    Urinary Incontinence Screening  01/15/2025    Medicare Annual Wellness Visit (AWV)  01/15/2025    Depression Screening  08/01/2025    Hepatitis C Screening  Completed    RSV Vaccine age 0-20 Months  Aged Out    HIB Vaccine  Aged Out    IPV Vaccine  Aged Out    Hepatitis A Vaccine  Aged Out    Meningococcal ACWY Vaccine  Aged Out    HPV Vaccine  Aged Out      Immunization History   Administered Date(s) Administered    COVID-19 PFIZER VACCINE 0.3 ML IM 04/13/2021, 05/06/2021       Naveen Oliveros MD    I spent 30 minutes with this patient of which greater than 50% was spent counseling or reviewing chart

## 2024-08-01 NOTE — ASSESSMENT & PLAN NOTE
Hyperlipidemia.  Patient will obtain lipid panel blood work and continue with current dose of statin therapy

## 2024-08-05 DIAGNOSIS — E03.9 HYPOTHYROIDISM, UNSPECIFIED TYPE: ICD-10-CM

## 2024-08-05 DIAGNOSIS — R73.9 HYPERGLYCEMIA: Primary | ICD-10-CM

## 2024-08-05 RX ORDER — LEVOTHYROXINE SODIUM 0.03 MG/1
25 TABLET ORAL
Qty: 100 TABLET | Refills: 1 | Status: SHIPPED | OUTPATIENT
Start: 2024-08-05

## 2024-10-17 ENCOUNTER — IOP CHECK (OUTPATIENT)
Dept: URBAN - METROPOLITAN AREA CLINIC 6 | Facility: CLINIC | Age: 79
End: 2024-10-17

## 2024-10-17 DIAGNOSIS — H40.033: ICD-10-CM

## 2024-10-17 PROCEDURE — 92012 INTRM OPH EXAM EST PATIENT: CPT

## 2024-10-17 ASSESSMENT — VISUAL ACUITY
OS_SC: 20/40
OD_SC: 20/40

## 2024-10-17 ASSESSMENT — TONOMETRY
OD_IOP_MMHG: 13
OS_IOP_MMHG: 16

## 2024-10-21 DIAGNOSIS — S76.211D INGUINAL STRAIN, RIGHT, SUBSEQUENT ENCOUNTER: ICD-10-CM

## 2024-10-22 RX ORDER — METHOCARBAMOL 500 MG/1
500 TABLET, FILM COATED ORAL 3 TIMES DAILY PRN
Qty: 40 TABLET | Refills: 0 | Status: SHIPPED | OUTPATIENT
Start: 2024-10-22

## 2024-11-08 DIAGNOSIS — G89.29 CHRONIC PAIN OF RIGHT KNEE: ICD-10-CM

## 2024-11-08 DIAGNOSIS — I10 HYPERTENSION, ESSENTIAL: Primary | ICD-10-CM

## 2024-11-08 DIAGNOSIS — M25.561 CHRONIC PAIN OF RIGHT KNEE: ICD-10-CM

## 2024-11-08 DIAGNOSIS — S76.211D INGUINAL STRAIN, RIGHT, SUBSEQUENT ENCOUNTER: ICD-10-CM

## 2024-11-08 DIAGNOSIS — I63.9 STROKE (HCC): ICD-10-CM

## 2024-11-08 DIAGNOSIS — E03.9 HYPOTHYROIDISM, UNSPECIFIED TYPE: ICD-10-CM

## 2024-11-08 RX ORDER — ASPIRIN 81 MG/1
81 TABLET ORAL DAILY
Qty: 90 TABLET | Refills: 1 | Status: SHIPPED | OUTPATIENT
Start: 2024-11-08

## 2024-11-08 RX ORDER — METHOCARBAMOL 500 MG/1
500 TABLET, FILM COATED ORAL 3 TIMES DAILY PRN
Qty: 40 TABLET | Refills: 0 | Status: SHIPPED | OUTPATIENT
Start: 2024-11-08

## 2024-11-08 RX ORDER — TRAMADOL HYDROCHLORIDE 50 MG/1
50 TABLET ORAL EVERY 8 HOURS PRN
Qty: 30 TABLET | Refills: 0 | Status: SHIPPED | OUTPATIENT
Start: 2024-11-08

## 2024-11-08 RX ORDER — ATORVASTATIN CALCIUM 80 MG/1
80 TABLET, FILM COATED ORAL EVERY EVENING
Qty: 90 TABLET | Refills: 3 | Status: SHIPPED | OUTPATIENT
Start: 2024-11-08

## 2024-11-08 RX ORDER — IBUPROFEN 800 MG/1
TABLET, FILM COATED ORAL
Qty: 60 TABLET | Refills: 0 | Status: SHIPPED | OUTPATIENT
Start: 2024-11-08

## 2024-11-08 RX ORDER — AMLODIPINE AND BENAZEPRIL HYDROCHLORIDE 5; 10 MG/1; MG/1
1 CAPSULE ORAL DAILY
Qty: 10 CAPSULE | Refills: 0 | Status: SHIPPED | OUTPATIENT
Start: 2024-11-08

## 2024-11-08 RX ORDER — LEVOTHYROXINE SODIUM 25 UG/1
25 TABLET ORAL
Qty: 100 TABLET | Refills: 1 | Status: SHIPPED | OUTPATIENT
Start: 2024-11-08

## 2024-11-08 NOTE — TELEPHONE ENCOUNTER
Medication: apixaban (Eliquis) 5 mg     Dose/Frequency: Take 1 tablet (5 mg total) by mouth 2 (two) times a day     Quantity: 180 tablet     Pharmacy: : [facility]   [facility] [address]     Office:   [x] PCP/Provider - Naveen Oliveros MD   [] Speciality/Provider -     Does the patient have enough for 3 days?   [x] Yes   [] No - Send as HP to POD        Medication: aspirin (ECOTRIN LOW STRENGTH) 81 mg EC tablet     Dose/Frequency: Take 1 tablet (81 mg total) by mouth daily     Quantity: 90 tablet     Pharmacy: [facility] [address]     Office:   [x] PCP/Provider - Naveen Oliveros MD   [] Speciality/Provider -     Does the patient have enough for 3 days?   [x] Yes   [] No - Send as HP to POD        Medication: atorvastatin (LIPITOR) 80 mg tablet     Dose/Frequency:      Take 1 tablet (80 mg total) by mouth every evening                    Quantity: 90 tablet     Pharmacy: [facility]   [facility] [address]     Office:   [x] PCP/Provider - Naveen Oliveros MD   [] Speciality/Provider -     Does the patient have enough for 3 days?   [x] Yes   [] No - Send as HP to POD          Medication: ibuprofen (MOTRIN) 800 mg tablet     Dose/Frequency: One po bid prn     Quantity: 60 tablet     Pharmacy: [facility] [address]     Office:   [x] PCP/Provider - Naveen Oliveros MD   [] Speciality/Provider -     Does the patient have enough for 3 days?   [x] Yes   [] No - Send as HP to POD        Medication: levothyroxine 25 mcg tablet     Dose/Frequency: Take 1 tablet (25 mcg total) by mouth daily in the early morning     Quantity: 100 tablet     Pharmacy: [facility]   [facility] [address]     Office:   [x] PCP/Provider - Naveen Oliveros MD   [] Speciality/Provider -     Does the patient have enough for 3 days?   [x] Yes   [] No - Send as HP to POD        Medication: methocarbamol (ROBAXIN) 500 mg tablet      Dose/Frequency: TAKE ONE TABLET BY MOUTH THREE TIMES A DAY AS NEEDED FOR MUSCLE SPASMS     Quantity: 40 tablet     Pharmacy: [facility]  [id number] [facility]     Office:   [x] PCP/Provider - Naveen Oliveros MD   [] Speciality/Provider -     Does the patient have enough for 3 days?   [x] Yes   [] No - Send as HP to POD        Medication: traMADol (Ultram) 50 mg tablet     Dose/Frequency: Take 1 tablet (50 mg total) by mouth every 8 (eight) hours as needed for moderate pain     Quantity: 30 tablet     Pharmacy: [facility] - [id number] [facility]     Office:   [x] PCP/Provider - Naveen Oliveros MD   [] Speciality/Provider -     Does the patient have enough for 3 days?   [x] Yes   [] No - Send as HP to POD

## 2024-11-08 NOTE — TELEPHONE ENCOUNTER
Pt called regarding: amLODIPine-benazepril (LOTREL 5-10) 5-10 MG per capsule   Pt has zero-please order a Three week supply until her mail order arrives. Pt stated she already ordered a new mail order directly with [facility], but she is requesting an emergency fill for 3 weeks to be sent to:    [facility], [address] [facility] [address] [place]  Phone [phone]  [phone]

## 2024-11-08 NOTE — TELEPHONE ENCOUNTER
Patient called again and stated that she needs her BP medication sent to the pharmacy NOW because she is leaving soon for the weekend.  I explained to her that the provider is in a room with a patient and she asked if she can quick do this for her since she is leaving in a few hours and will not be back until Monday.  I stated that as soon as she can she will take care of it and that we have 72 hours to answer a message.  She stated that if anything happens to her that we will be held responsible and that she will be putting in a complaint since she called this morning and its not taken care of yet.  I did explain that we have a lot of patients and she will do it as soon as she can and that we try to take care of them before the end of day but was not satisfied with that. And hung up.

## 2024-11-18 DIAGNOSIS — I10 HYPERTENSION, ESSENTIAL: ICD-10-CM

## 2024-11-18 NOTE — TELEPHONE ENCOUNTER
Patient needs a refill.  She states she used to be able to get refills after her 90 day supply was finished.  Please advise.

## 2024-11-19 RX ORDER — AMLODIPINE AND BENAZEPRIL HYDROCHLORIDE 5; 10 MG/1; MG/1
1 CAPSULE ORAL DAILY
Qty: 90 CAPSULE | Refills: 2 | Status: SHIPPED | OUTPATIENT
Start: 2024-11-19

## 2024-12-17 ENCOUNTER — TELEPHONE (OUTPATIENT)
Dept: FAMILY MEDICINE CLINIC | Facility: CLINIC | Age: 79
End: 2024-12-17

## 2024-12-17 NOTE — TELEPHONE ENCOUNTER
Voicemail message left for the patient in attempt to schedule their 2024 Medicare Annual Wellness Visit.  When patient returns the call, please schedule this appointment BEFORE 12/31/2024.  Please reschedule before march her AWV

## 2024-12-23 DIAGNOSIS — E03.9 HYPOTHYROIDISM, UNSPECIFIED TYPE: ICD-10-CM

## 2024-12-23 NOTE — TELEPHONE ENCOUNTER
As per Patient her pharmacy does not have refills.  Reason for call:   [x] Refill   [] Prior Auth  [] Other:     Office:   [x] PCP/Provider -   [] Specialty/Provider -     Medication: levothyroxine 25 mcg tablet     Dose/Frequency: Take 1 tablet (25 mcg total) by mouth daily     Quantity: 100 tablet    Pharmacy: [facility]      Does the patient have enough for 3 days?   [x] Yes   [] No - Send as HP to POD

## 2024-12-24 RX ORDER — LEVOTHYROXINE SODIUM 25 UG/1
25 TABLET ORAL
Qty: 100 TABLET | Refills: 1 | Status: SHIPPED | OUTPATIENT
Start: 2024-12-24

## 2025-03-11 ENCOUNTER — NURSE TRIAGE (OUTPATIENT)
Age: 80
End: 2025-03-11

## 2025-03-11 NOTE — TELEPHONE ENCOUNTER
"  FOLLOW UP: not at this moment    REASON FOR CONVERSATION: Diarrhea    SYMPTOMS: Clear diarrhea, possible dehydration    OTHER: BP is down, dizziness upon standing    DISPOSITION: Home Care      Advised and educated patient on use of Immodium. Also recommended Pedialyte, yogurt, toast, crackers, light diet if tolerated. Pt will monitor symptoms for next few days while taking Immodium and call back if no improvement.                Reason for Disposition   MILD-MODERATE diarrhea (e.g., 1-6 times / day more than normal)    Answer Assessment - Initial Assessment Questions  1. DIARRHEA SEVERITY: \"How bad is the diarrhea?\" \"How many more stools have you had in the past 24 hours than normal?\"       Continuous for several weeks    2. ONSET: \"When did the diarrhea begin?\"       A few weeks ago    3. STOOL DESCRIPTION:  \"How loose or watery is the diarrhea?\" \"What is the stool color?\" \"Is there any blood or mucous in the stool?\"      Completely clear stool    4. VOMITING: \"Are you also vomiting?\" If Yes, ask: \"How many times in the past 24 hours?\"       Denies    5. ABDOMEN PAIN: \"Are you having any abdomen pain?\" If Yes, ask: \"What does it feel like?\" (e.g., crampy, dull, intermittent, constant)       Did not say    6. ABDOMEN PAIN SEVERITY: If present, ask: \"How bad is the pain?\"  (e.g., Scale 1-10; mild, moderate, or severe)      N/A    7. ORAL INTAKE: If vomiting, \"Have you been able to drink liquids?\" \"How much liquids have you had in the past 24 hours?\"      States she has been drinking water, tea coffee    8. HYDRATION: \"Any signs of dehydration?\" (e.g., dry mouth [not just dry lips], too weak to stand, dizziness, new weight loss) \"When did you last urinate?\"      Drop in blood pressure, dizzy when standing up    9. EXPOSURE: \"Have you traveled to a foreign country recently?\" \"Have you been exposed to anyone with diarrhea?\" \"Could you have eaten any food that was spoiled?\"      Did not say    10. ANTIBIOTIC USE: \"Are " "you taking antibiotics now or have you taken antibiotics in the past 2 months?\"        Denies    11. OTHER SYMPTOMS: \"Do you have any other symptoms?\" (e.g., fever, blood in stool)        Denies    Protocols used: Diarrhea-Adult-[place]    "

## 2025-03-13 ENCOUNTER — NURSE TRIAGE (OUTPATIENT)
Age: 80
End: 2025-03-13

## 2025-03-13 NOTE — TELEPHONE ENCOUNTER
"FOLLOW UP: Virtual Visit Tomorrow   @ 2:20pm    REASON FOR CONVERSATION: Diarrhea    SYMPTOMS: FYI-Patient triaged on 3/11 as well. Clear, water-like diarrhea for 3 weeks, 2-3 times a day. No relief from Pepto Bismol or Imodium. Able to drink water, Pedialyte made patient vomit (did not like taste). Home Care advice given. Advised patient to call back is new symptoms arise, symptoms worsen, or SBP drops into the 80s.     OTHER: Does feel slightly dizzy from sitting to standing, BP 97/40 this morning. Encouraged fluids.    DISPOSITION: Home Care      Reason for Disposition   SEVERE diarrhea (e.g., 7 or more times / day more than normal)    Answer Assessment - Initial Assessment Questions  1. DIARRHEA SEVERITY: \"How bad is the diarrhea?\" \"How many more stools have you had in the past 24 hours than normal?\"       2-3 times a day  2. ONSET: \"When did the diarrhea begin?\"       3 weeks  3. STOOL DESCRIPTION:  \"How loose or watery is the diarrhea?\" \"What is the stool color?\" \"Is there any blood or mucous in the stool?\"      Like water coming out a faucet, clear  4. VOMITING: \"Are you also vomiting?\" If Yes, ask: \"How many times in the past 24 hours?\"       Threw up pedialyte, did not agree with stomach, other than that no  5. ABDOMEN PAIN: \"Are you having any abdomen pain?\" If Yes, ask: \"What does it feel like?\" (e.g., crampy, dull, intermittent, constant)       Denies  6. ABDOMEN PAIN SEVERITY: If present, ask: \"How bad is the pain?\"  (e.g., Scale 1-10; mild, moderate, or severe)      Denies  7. ORAL INTAKE: If vomiting, \"Have you been able to drink liquids?\" \"How much liquids have you had in the past 24 hours?\"      Not vomiting  8. HYDRATION: \"Any signs of dehydration?\" (e.g., dry mouth [not just dry lips], too weak to stand, dizziness, new weight loss) \"When did you last urinate?\"      Drinking water, only thing I can keep down, and some iced tea  9. EXPOSURE: \"Have you traveled to a foreign country recently?\" \"Have " "you been exposed to anyone with diarrhea?\" \"Could you have eaten any food that was spoiled?\"      Denies  10. ANTIBIOTIC USE: \"Are you taking antibiotics now or have you taken antibiotics in the past 2 months?\"       Denies  11. OTHER SYMPTOMS: \"Do you have any other symptoms?\" (e.g., fever, blood in stool)        Dizziness at times, BP this morning 97/40  12. PREGNANCY: \"Is there any chance you are pregnant?\" \"When was your last menstrual period?\"            Protocols used: Diarrhea-Adult-[place]    "

## 2025-03-13 NOTE — TELEPHONE ENCOUNTER
I am not sure what she means about coming in for her annual visit?  All I can say is she would need an in person evaluation given her symptoms to make appropriate recommendations on how to treat her.

## 2025-03-13 NOTE — TELEPHONE ENCOUNTER
Patient would require an IN OFFICE evaluation given her list of symptoms.  A virtual appointment would not be appropriate to evaluate patient.  She may see any of our providers.  If symptoms worsen I would recommend evaluation in emergency room

## 2025-03-14 ENCOUNTER — OFFICE VISIT (OUTPATIENT)
Dept: FAMILY MEDICINE CLINIC | Facility: CLINIC | Age: 80
End: 2025-03-14
Payer: MEDICARE

## 2025-03-14 VITALS
OXYGEN SATURATION: 95 % | WEIGHT: 123.38 LBS | BODY MASS INDEX: 22.7 KG/M2 | DIASTOLIC BLOOD PRESSURE: 70 MMHG | HEART RATE: 67 BPM | HEIGHT: 62 IN | RESPIRATION RATE: 17 BRPM | SYSTOLIC BLOOD PRESSURE: 130 MMHG | TEMPERATURE: 97.6 F

## 2025-03-14 DIAGNOSIS — E03.9 ACQUIRED HYPOTHYROIDISM: ICD-10-CM

## 2025-03-14 DIAGNOSIS — R19.7 DIARRHEA, UNSPECIFIED TYPE: Primary | ICD-10-CM

## 2025-03-14 DIAGNOSIS — I10 HYPERTENSION, ESSENTIAL: ICD-10-CM

## 2025-03-14 DIAGNOSIS — E78.49 OTHER HYPERLIPIDEMIA: ICD-10-CM

## 2025-03-14 PROCEDURE — G0439 PPPS, SUBSEQ VISIT: HCPCS | Performed by: FAMILY MEDICINE

## 2025-03-14 PROCEDURE — 99214 OFFICE O/P EST MOD 30 MIN: CPT | Performed by: FAMILY MEDICINE

## 2025-03-14 PROCEDURE — G2211 COMPLEX E/M VISIT ADD ON: HCPCS | Performed by: FAMILY MEDICINE

## 2025-03-14 NOTE — PROGRESS NOTES
Name: Margarita Ramirez      : 1945      MRN: [id number]  Encounter Provider: Naveen Oliveros MD  Encounter Date: 3/14/2025   Encounter department: [facility], [place], operated by [facility]    Assessment & Plan  Diarrhea, unspecified type  Diarrhea.  Patient symptoms appear to be improving spontaneously.  She was recommended to try Metamucil once daily to firm up her bowel movements.  Patient declined any other treatment or evaluation.         Hypertension, essential  Hypertension.  The patient's blood pressure is stable at this time and he will continue current regimen of medications         Other hyperlipidemia  Hyperlipidemia.  Lipid panel is stable on current dose of statin therapy         Acquired hypothyroidism  Hypothyroidism.  Patient not always compliant with levothyroxine medication.  She will repeat TSH blood work prior to next office visit            Preventive health issues were discussed with patient, and age appropriate screening tests were ordered as noted in patient's After Visit Summary. Personalized health advice and appropriate referrals for health education or preventive services given if needed, as noted in patient's After Visit Summary.    History of Present Illness     Patient in the office with a history of diarrhea approximately 10 days ago where she was having 6 loose bowel movements daily.  Since then symptoms have improved.  He may be going once daily but is still somewhat loose.  She denies any nausea vomiting or fevers.  She has no travel history or changes in diet.    Patient normally exercises half hour on a stationary bicycle as well as half hour with weights and calisthenics.  She has purposely lost some weight due to decreased caloric intake.    Diarrhea        Patient Care Team:  Naveen Oliveros MD as PCP - General (Family Medicine)    Review of Systems   Constitutional: Negative.    Respiratory: Negative.     Cardiovascular: Negative.    Gastrointestinal:  Positive for diarrhea.      Medical History Reviewed by provider this encounter:  [facility]       Annual Wellness Visit Questionnaire   Margarita is here for her Subsequent Wellness visit.     Health Risk Assessment:   Patient rates overall health as good. Patient feels that their physical health rating is same. Patient is satisfied with their life. Eyesight was rated as same. Hearing was rated as same. Patient feels that their emotional and mental health rating is same. Patients states they are never, rarely angry. Patient states they are sometimes unusually tired/fatigued. Pain experienced in the last 7 days has been some. Patient's pain rating has been 8/10. Patient states that she has experienced no weight loss or gain in last 6 months.     Depression Screening:   PHQ-2 Score: 0      Fall Risk Screening:   In the past year, patient has experienced: no history of falling in past year      Urinary Incontinence Screening:   Patient has leaked urine accidently in the last six months.     Home Safety:  Patient has trouble with stairs inside or outside of their home. Patient has working smoke alarms and has working carbon monoxide detector. Home safety hazards include: none.     Nutrition:   Current diet is Regular.     Medications:   Patient is currently taking over-the-counter supplements. OTC medications include: see medication list. Patient is able to manage medications.     Activities of Daily Living (ADLs)/Instrumental Activities of Daily Living (IADLs):   Walk and transfer into and out of bed and chair?: Yes  Dress and groom yourself?: Yes    Bathe or shower yourself?: Yes    Feed yourself? Yes  Do your laundry/housekeeping?: Yes  Manage your money, pay your bills and track your expenses?: Yes  Make your own meals?: Yes    Do your own shopping?: Yes    Previous Hospitalizations:   Any hospitalizations or ED visits within the last 12 months?: No      Advance Care Planning:   Living will: Yes    Durable POA for healthcare: Yes    Advanced  "directive: Yes      PREVENTIVE SCREENINGS      Cardiovascular Screening:    General: Screening Not Indicated, History Lipid Disorder and Risks and Benefits Discussed    Due for: Lipid Panel      Diabetes Screening:     General: Risks and Benefits Discussed    Due for: Blood Glucose      Colorectal Cancer Screening:     General: Screening Not Indicated      Breast Cancer Screening:     General: Screening Not Indicated      Cervical Cancer Screening:    General: Screening Not Indicated      Lung Cancer Screening:     General: Screening Not Indicated      Hepatitis C Screening:    General: Screening Current    Screening, Brief Intervention, and Referral to Treatment (SBIRT)     Screening  Typical number of drinks in a day: 0  Typical number of drinks in a week: 0  Interpretation: Low risk drinking behavior.    Single Item Drug Screening:  How often have you used an illegal drug (including marijuana) or a prescription medication for non-medical reasons in the past year? never    Single Item Drug Screen Score: 0  Interpretation: Negative screen for possible drug use disorder    Social Drivers of Health     Financial Resource Strain: Low Risk  (1/15/2024)    Overall Financial Resource Strain (CARDIA)     Difficulty of Paying Living Expenses: Not hard at all   Transportation Needs: No Transportation Needs (1/15/2024)    PRAPARE - Transportation     Lack of Transportation (Medical): No     Lack of Transportation (Non-Medical): No     No results found.    Objective   /70 (Patient Position: Sitting, Cuff Size: Adult)   Pulse 67   Temp 97.6 °F (36.4 °C) (Temporal)   Resp 17   Ht 5' 2\" (1.575 m)   Wt 56 kg (123 lb 6 oz)   SpO2 95%   BMI 22.57 kg/m²     Physical Exam  Vitals and nursing note reviewed.   Constitutional:       General: She is not in acute distress.     Appearance: Normal appearance. She is well-developed. She is not ill-appearing.   HENT:      Head: Normocephalic and atraumatic.      Right Ear: " External ear normal.      Left Ear: External ear normal.      Nose: Nose normal.   Eyes:      General:         Right eye: No discharge.         Left eye: No discharge.      Extraocular Movements: Extraocular movements intact.      Conjunctiva/sclera: Conjunctivae normal.      Pupils: Pupils are equal, round, and reactive to light.   Neck:      Thyroid: No thyromegaly.      Vascular: No carotid bruit.   Cardiovascular:      Rate and Rhythm: Normal rate and regular rhythm.      Heart sounds: Normal heart sounds. No murmur heard.  Pulmonary:      Effort: Pulmonary effort is normal. No respiratory distress.      Breath sounds: Normal breath sounds.   Abdominal:      General: Abdomen is flat. Bowel sounds are normal.      Palpations: Abdomen is soft.      Tenderness: There is no abdominal tenderness. There is no guarding or rebound.      Comments: NO hepatospenomegaly   Musculoskeletal:         General: Normal range of motion.      Cervical back: Normal range of motion and neck supple.      Right lower leg: No edema.      Left lower leg: No edema.   Lymphadenopathy:      Cervical: No cervical adenopathy.   Skin:     General: Skin is warm and dry.      Capillary Refill: Capillary refill takes less than 2 seconds.      Comments: NO RASHES   Neurological:      Mental Status: She is alert and oriented to person, place, and time. Mental status is at baseline.   Psychiatric:         Mood and Affect: Mood normal.         Behavior: Behavior normal.         Thought Content: Thought content normal.         Judgment: Judgment normal.     I spent 30 minutes with this patient of which greater than 50% was spent counseling or reviewing chart

## 2025-03-17 ENCOUNTER — TELEPHONE (OUTPATIENT)
Age: 80
End: 2025-03-17

## 2025-03-17 PROBLEM — R19.7 DIARRHEA: Status: ACTIVE | Noted: 2025-03-17

## 2025-03-17 PROBLEM — E03.9 HYPOTHYROIDISM: Status: ACTIVE | Noted: 2025-03-17

## 2025-03-17 NOTE — TELEPHONE ENCOUNTER
Patient had called in and stated that she saw the provider on Friday, and was told to cut her blood pressure medication in half.     She had mentioned that she is unable to cut the medication in half due to it being a capsule.     Please have dr. Oliveros reach out to the patient in regards to moving forward

## 2025-03-17 NOTE — ASSESSMENT & PLAN NOTE
Hypothyroidism.  Patient not always compliant with levothyroxine medication.  She will repeat TSH blood work prior to next office visit

## 2025-03-17 NOTE — ASSESSMENT & PLAN NOTE
Diarrhea.  Patient symptoms appear to be improving spontaneously.  She was recommended to try Metamucil once daily to firm up her bowel movements.  Patient declined any other treatment or evaluation.

## 2025-03-18 DIAGNOSIS — I10 HYPERTENSION, ESSENTIAL: Primary | ICD-10-CM

## 2025-03-18 RX ORDER — AMLODIPINE BESYLATE 5 MG/1
5 TABLET ORAL DAILY
Qty: 90 TABLET | Refills: 1 | Status: SHIPPED | OUTPATIENT
Start: 2025-03-18 | End: 2025-03-19 | Stop reason: SDUPTHER

## 2025-03-18 NOTE — TELEPHONE ENCOUNTER
Since she is unable to split a capsule I would send in a new prescription just for amlodipine 5 mg alone to take once daily.  This should still control her blood pressure but not let her go too low due to her recent weight loss.  I will send prescription to pharmacy.  Once she picks up prescription she may discontinue her current medication

## 2025-03-18 NOTE — TELEPHONE ENCOUNTER
Perlita would like to know asa what is she suppose to do with taking the medication. She said, if she does  not hear from anyone day she will take it every other day. Please call

## 2025-03-19 ENCOUNTER — TELEPHONE (OUTPATIENT)
Age: 80
End: 2025-03-19

## 2025-03-19 DIAGNOSIS — I10 HYPERTENSION, ESSENTIAL: ICD-10-CM

## 2025-03-19 RX ORDER — AMLODIPINE BESYLATE 5 MG/1
5 TABLET ORAL DAILY
Qty: 30 TABLET | Refills: 0 | Status: SHIPPED | OUTPATIENT
Start: 2025-03-19 | End: 2025-03-26 | Stop reason: SDUPTHER

## 2025-03-26 DIAGNOSIS — I10 HYPERTENSION, ESSENTIAL: ICD-10-CM

## 2025-03-26 RX ORDER — AMLODIPINE BESYLATE 5 MG/1
5 TABLET ORAL DAILY
Qty: 90 TABLET | Refills: 1 | Status: SHIPPED | OUTPATIENT
Start: 2025-03-26

## 2025-03-26 NOTE — TELEPHONE ENCOUNTER
Patient is concerned regarding her medication as it was not sent to the mail order pharmacy. She had a 30 day supply sent to [facility] that she picked up, but will need this sent in as soon as possible to the mail pharmacy. Please call patient once medication has been sent to the pharmacy. She expressed her frustration that the mediation has not been sent to the mail yet and that once the pharmacy receives it, it will take them 3 weeks to get it to her. Thank you    CB# [phone]    Medication: Amlodipine     Dose/Frequency: 5mg     Quantity: 90 day supply if possible     [facility]     Office:   [x] PCP/Provider -   [] Speciality/Provider -     Does the patient have enough for 3 days?   [x] Yes   [] No - Send as HP to POD

## 2025-04-17 ENCOUNTER — ESTABLISHED COMPREHENSIVE EXAM (OUTPATIENT)
Dept: URBAN - METROPOLITAN AREA CLINIC 6 | Facility: CLINIC | Age: 80
End: 2025-04-17

## 2025-04-17 DIAGNOSIS — H25.813: ICD-10-CM

## 2025-04-17 DIAGNOSIS — H40.033: ICD-10-CM

## 2025-04-17 DIAGNOSIS — H40.023: ICD-10-CM

## 2025-04-17 PROCEDURE — 92014 COMPRE OPH EXAM EST PT 1/>: CPT

## 2025-04-17 PROCEDURE — 92133 CPTRZD OPH DX IMG PST SGM ON: CPT

## 2025-04-17 PROCEDURE — 92020 GONIOSCOPY: CPT

## 2025-04-17 PROCEDURE — 92202 OPSCPY EXTND ON/MAC DRAW: CPT

## 2025-04-17 ASSESSMENT — TONOMETRY
OD_IOP_MMHG: 25
OS_IOP_MMHG: 21

## 2025-04-17 ASSESSMENT — VISUAL ACUITY
OU_SC: J1
OS_SC: 20/50-2
OD_SC: 20/50-2

## 2025-05-27 DIAGNOSIS — E03.9 HYPOTHYROIDISM, UNSPECIFIED TYPE: ICD-10-CM

## 2025-05-27 NOTE — TELEPHONE ENCOUNTER
Patient called to request a refill for their Levothjyroxine 25mcg advised a refill was requested on 5/27/25 and is pending approval. Patient verbalized understanding and is in agreement.     Does the patient have enough for 3 days?   [x] Yes   [] No - Send as HP to POD

## 2025-05-28 RX ORDER — LEVOTHYROXINE SODIUM 25 UG/1
TABLET ORAL
Qty: 90 TABLET | Refills: 1 | Status: SHIPPED | OUTPATIENT
Start: 2025-05-28

## 2025-05-28 NOTE — TELEPHONE ENCOUNTER
Pt calling in requesting levothyroxine refill; she stated she would like it sent as soon as possible please because last time it was not done correctly. Please advise; pt would like a call once completed and/or leave VM.

## 2025-07-21 DIAGNOSIS — S76.211D INGUINAL STRAIN, RIGHT, SUBSEQUENT ENCOUNTER: ICD-10-CM

## 2025-07-21 DIAGNOSIS — G89.29 CHRONIC PAIN OF RIGHT KNEE: ICD-10-CM

## 2025-07-21 DIAGNOSIS — I10 HYPERTENSION, ESSENTIAL: ICD-10-CM

## 2025-07-21 DIAGNOSIS — M25.561 CHRONIC PAIN OF RIGHT KNEE: ICD-10-CM

## 2025-07-21 DIAGNOSIS — E03.9 HYPOTHYROIDISM, UNSPECIFIED TYPE: ICD-10-CM

## 2025-07-21 DIAGNOSIS — I63.9 STROKE (HCC): ICD-10-CM

## 2025-07-21 RX ORDER — IBUPROFEN 800 MG/1
TABLET, FILM COATED ORAL
Qty: 180 TABLET | Refills: 0 | Status: SHIPPED | OUTPATIENT
Start: 2025-07-21

## 2025-07-21 RX ORDER — AMLODIPINE BESYLATE 5 MG/1
5 TABLET ORAL DAILY
Qty: 90 TABLET | Refills: 1 | Status: SHIPPED | OUTPATIENT
Start: 2025-07-21

## 2025-07-21 RX ORDER — METHOCARBAMOL 500 MG/1
500 TABLET, FILM COATED ORAL 3 TIMES DAILY PRN
Qty: 40 TABLET | Refills: 0 | Status: SHIPPED | OUTPATIENT
Start: 2025-07-21

## 2025-07-21 RX ORDER — ATORVASTATIN CALCIUM 80 MG/1
80 TABLET, FILM COATED ORAL EVERY EVENING
Qty: 90 TABLET | Refills: 1 | Status: SHIPPED | OUTPATIENT
Start: 2025-07-21

## 2025-07-21 RX ORDER — TRAMADOL HYDROCHLORIDE 50 MG/1
50 TABLET ORAL EVERY 8 HOURS PRN
Qty: 30 TABLET | Refills: 0 | Status: SHIPPED | OUTPATIENT
Start: 2025-07-21

## 2025-07-21 RX ORDER — LEVOTHYROXINE SODIUM 25 UG/1
25 TABLET ORAL DAILY
Qty: 90 TABLET | Refills: 1 | Status: SHIPPED | OUTPATIENT
Start: 2025-07-21

## 2025-07-21 NOTE — TELEPHONE ENCOUNTER
Aware refill placed on Levothyroxine, patient asked to have refill resent as she did not see refill on bottle.     Reason for call:   [x] Refill   [] Prior Auth  [] Other:     Office:   [x] PCP/Provider - Naveen Oliveros MD   [] Specialty/Provider -     Medication: traMADol (Ultram) 50 mg tablet / Take 1 tablet (50 mg total) by mouth every 8 (eight) hours as needed for moderate pain     methocarbamol (ROBAXIN) 500 mg tablet / Take 1 tablet (500 mg total) by mouth 3 (three) times a day as needed for muscle spasms    levothyroxine 25 mcg tablet /  TAKE ONE TABLET BY MOUTH EVERY DAY IN THE EARLY MORNING      ibuprofen (MOTRIN) 800 mg tablet / One po bid prn     atorvastatin (LIPITOR) 80 mg tablet / Take 1 tablet (80 mg total) by mouth every evening     apixaban (Eliquis) 5 mg / Take 1 tablet (5 mg total) by mouth 2 (two) times a day    amLODIPine (NORVASC) 5 mg tablet / Take 1 tablet (5 mg total) by mouth daily     Pharmacy: [facility]  [address] [facility]     Mail Away Pharmacy   Does the patient have enough for 10 days?   [] Yes   [x] No - Send as HP to POD\

## (undated) RX ORDER — LATANOPROSTENE BUNOD 0.24 MG/ML: 1 SOLUTION/ DROPS OPHTHALMIC EVERY EVENING